# Patient Record
Sex: MALE | Race: WHITE | NOT HISPANIC OR LATINO | Employment: FULL TIME | URBAN - METROPOLITAN AREA
[De-identification: names, ages, dates, MRNs, and addresses within clinical notes are randomized per-mention and may not be internally consistent; named-entity substitution may affect disease eponyms.]

---

## 2017-10-20 ENCOUNTER — HOSPITAL ENCOUNTER (OUTPATIENT)
Dept: CARDIOLOGY | Facility: HOSPITAL | Age: 27
Discharge: HOME | End: 2017-10-20
Attending: INTERNAL MEDICINE | Admitting: INTERNAL MEDICINE

## 2018-06-21 ENCOUNTER — HOSPITAL ENCOUNTER (OUTPATIENT)
Dept: OTHER | Facility: HOSPITAL | Age: 28
Setting detail: SPECIMEN
Discharge: HOME | End: 2018-06-21
Attending: SURGERY | Admitting: SURGERY

## 2020-04-23 ENCOUNTER — TELEMEDICINE (OUTPATIENT)
Dept: FAMILY MEDICINE CLINIC | Facility: CLINIC | Age: 30
End: 2020-04-23

## 2020-04-23 DIAGNOSIS — R19.7 DIARRHEA, UNSPECIFIED TYPE: ICD-10-CM

## 2020-04-23 DIAGNOSIS — K21.9 GASTROESOPHAGEAL REFLUX DISEASE WITHOUT ESOPHAGITIS: ICD-10-CM

## 2020-04-23 DIAGNOSIS — G44.219 EPISODIC TENSION-TYPE HEADACHE, NOT INTRACTABLE: Primary | ICD-10-CM

## 2020-04-23 PROBLEM — R07.9 CHEST PAIN: Status: RESOLVED | Noted: 2017-10-04 | Resolved: 2020-04-23

## 2020-04-23 PROBLEM — R07.9 CHEST PAIN: Status: ACTIVE | Noted: 2017-10-04

## 2020-04-23 PROCEDURE — 99203 OFFICE O/P NEW LOW 30 MIN: CPT | Performed by: FAMILY MEDICINE

## 2020-04-23 RX ORDER — FAMOTIDINE 40 MG/1
40 TABLET, FILM COATED ORAL DAILY PRN
Qty: 90 TABLET | Refills: 0 | Status: SHIPPED | OUTPATIENT
Start: 2020-04-23 | End: 2020-09-08

## 2020-04-23 RX ORDER — DIPHENOXYLATE HYDROCHLORIDE AND ATROPINE SULFATE 2.5; .025 MG/1; MG/1
1 TABLET ORAL
COMMUNITY
End: 2020-09-14

## 2020-04-23 NOTE — PROGRESS NOTES
Subjective   John Andrews is a 29 y.o. male.     Chief Complaint   Patient presents with   • Hypertension     BP has been getting high and he has been getting headaches. Patient had a test done before that showed a leaky valve.   • Heartburn     heartburn x1 year it comes and goes          Current Outpatient Medications:   •  famotidine (PEPCID) 40 MG tablet, Take 1 tablet by mouth Daily As Needed for Indigestion or Heartburn., Disp: 90 tablet, Rfl: 0  •  multivitamin (THERAGRAN) tablet tablet, Take 1 tablet by mouth Daily With Breakfast., Disp: , Rfl:     History reviewed. No pertinent past medical history.    Past Surgical History:   Procedure Laterality Date   • PILONIDAL CYSTECTOMY         Family History   Problem Relation Age of Onset   • No Known Problems Father    • Stroke Maternal Grandmother    • Hypertension Maternal Grandmother    • Hypertension Paternal Grandfather    • Heart disease Paternal Grandfather         Hypertrophic Cardiomyopathy/ Valve   • Diabetes Other    • Stroke Other    • No Known Problems Mother    • No Known Problems Sister    • Cancer Maternal Grandfather         Lung Cancer       Social History     Socioeconomic History   • Marital status:      Spouse name: Not on file   • Number of children: Not on file   • Years of education: Not on file   • Highest education level: Not on file   Tobacco Use   • Smoking status: Never Smoker   • Smokeless tobacco: Never Used   Substance and Sexual Activity   • Alcohol use: Yes     Alcohol/week: 5.0 standard drinks     Types: 5 Cans of beer per week   • Drug use: Not Currently   • Sexual activity: Defer       28 y/o C male here to [facility] care via videovisit w/ a few concerns    Pt has recently been having Post Rt sided HA's x 1mo or so and checked his BP (BP <130/100) -----Wife is Rad tech and took it manually ......the patient states HA's started about month ago and admits he went on furlough about 6 weeks ago.......denies N/V/Visual issues w/ the  HA's; Pt states he took some Asa for them and they finally went away; no family hx/o Migraines in family or personally       Pt also w/ c/o's GERD symptoms off/on w/ unknown triggers ------Pt tried taking Tums but not too effective; no black stools or N/ V/ bloating  Pt also w/ off/on non-bloody watery Diarrhea x 1 yr------Admits not tried doing a food/ incident log; denies hx/o Dairy intol.; not sure if it has any assoc w/ stress       The following portions of the patient's history were reviewed and updated as appropriate: allergies, current medications, past family history, past medical history, past social history, past surgical history and problem list.    Review of Systems   Constitutional: Positive for activity change, appetite change, fatigue and unexpected weight gain. Negative for fever and unexpected weight loss.   Eyes: Negative for blurred vision, photophobia and visual disturbance.   Gastrointestinal: Positive for diarrhea and GERD. Negative for abdominal distention, abdominal pain, constipation, nausea and vomiting.   Skin: Negative for rash.   Neurological: Positive for headache. Negative for dizziness, syncope, weakness and light-headedness.   Psychiatric/Behavioral: Negative for dysphoric mood and sleep disturbance.       There were no vitals filed for this visit.    Objective   Physical Exam   Constitutional: He is oriented to person, place, and time. He appears well-developed and well-nourished. No distress.   HENT:   Head: Normocephalic and atraumatic.   Neck: Normal range of motion.   Pulmonary/Chest: Effort normal. No respiratory distress.   Neurological: He is alert and oriented to person, place, and time. No cranial nerve deficit.   Skin: He is not diaphoretic.   Psychiatric: He has a normal mood and affect. His behavior is normal. Judgment and thought content normal.         Assessment/Plan   John was seen today for hypertension and heartburn.    Diagnoses and all orders for this  visit:    Episodic tension-type headache, not intractable    Gastroesophageal reflux disease without esophagitis    Diarrhea, unspecified type    Other orders  -     famotidine (PEPCID) 40 MG tablet; Take 1 tablet by mouth Daily As Needed for Indigestion or Heartburn.    Disc'd tx options for HA.....The patient is advised to apply ice or cold packs intermittently as needed to relieve pain/ NSAIDS  Disc'd GERD triggers/ avoidance  Disc'd poss lactose intol vs stress induced diarrhea vs other foods------pt to keep food/ incident log  Pt to cont to monitor BP when at rest and call if stays >140/90's

## 2020-08-17 ENCOUNTER — OFFICE (AMBULATORY)
Dept: URBAN - METROPOLITAN AREA CLINIC 64 | Facility: CLINIC | Age: 30
End: 2020-08-17

## 2020-08-17 VITALS
HEIGHT: 75 IN | HEART RATE: 75 BPM | SYSTOLIC BLOOD PRESSURE: 144 MMHG | DIASTOLIC BLOOD PRESSURE: 80 MMHG | WEIGHT: 268 LBS

## 2020-08-17 DIAGNOSIS — R19.7 DIARRHEA, UNSPECIFIED: ICD-10-CM

## 2020-08-17 DIAGNOSIS — K62.5 HEMORRHAGE OF ANUS AND RECTUM: ICD-10-CM

## 2020-08-17 DIAGNOSIS — R19.8 OTHER SPECIFIED SYMPTOMS AND SIGNS INVOLVING THE DIGESTIVE S: ICD-10-CM

## 2020-08-17 DIAGNOSIS — R19.4 CHANGE IN BOWEL HABIT: ICD-10-CM

## 2020-08-17 PROCEDURE — 99244 OFF/OP CNSLTJ NEW/EST MOD 40: CPT | Performed by: NURSE PRACTITIONER

## 2020-08-24 ENCOUNTER — OFFICE (AMBULATORY)
Dept: URBAN - METROPOLITAN AREA PATHOLOGY 4 | Facility: PATHOLOGY | Age: 30
End: 2020-08-24
Payer: COMMERCIAL

## 2020-08-24 ENCOUNTER — ON CAMPUS - OUTPATIENT (AMBULATORY)
Dept: URBAN - METROPOLITAN AREA HOSPITAL 2 | Facility: HOSPITAL | Age: 30
End: 2020-08-24
Payer: COMMERCIAL

## 2020-08-24 VITALS
DIASTOLIC BLOOD PRESSURE: 97 MMHG | SYSTOLIC BLOOD PRESSURE: 141 MMHG | SYSTOLIC BLOOD PRESSURE: 117 MMHG | SYSTOLIC BLOOD PRESSURE: 158 MMHG | HEART RATE: 87 BPM | HEART RATE: 84 BPM | OXYGEN SATURATION: 100 % | SYSTOLIC BLOOD PRESSURE: 128 MMHG | DIASTOLIC BLOOD PRESSURE: 74 MMHG | DIASTOLIC BLOOD PRESSURE: 90 MMHG | WEIGHT: 264 LBS | OXYGEN SATURATION: 97 % | OXYGEN SATURATION: 98 % | HEART RATE: 93 BPM | HEART RATE: 66 BPM | SYSTOLIC BLOOD PRESSURE: 131 MMHG | SYSTOLIC BLOOD PRESSURE: 119 MMHG | OXYGEN SATURATION: 99 % | HEART RATE: 72 BPM | DIASTOLIC BLOOD PRESSURE: 60 MMHG | DIASTOLIC BLOOD PRESSURE: 84 MMHG | HEIGHT: 75 IN | RESPIRATION RATE: 16 BRPM | HEART RATE: 86 BPM | RESPIRATION RATE: 18 BRPM | DIASTOLIC BLOOD PRESSURE: 81 MMHG | SYSTOLIC BLOOD PRESSURE: 121 MMHG | TEMPERATURE: 98 F | DIASTOLIC BLOOD PRESSURE: 66 MMHG | SYSTOLIC BLOOD PRESSURE: 153 MMHG | DIASTOLIC BLOOD PRESSURE: 99 MMHG | HEART RATE: 96 BPM | DIASTOLIC BLOOD PRESSURE: 88 MMHG

## 2020-08-24 DIAGNOSIS — K62.5 HEMORRHAGE OF ANUS AND RECTUM: ICD-10-CM

## 2020-08-24 DIAGNOSIS — K52.9 NONINFECTIVE GASTROENTERITIS AND COLITIS, UNSPECIFIED: ICD-10-CM

## 2020-08-24 DIAGNOSIS — R19.4 CHANGE IN BOWEL HABIT: ICD-10-CM

## 2020-08-24 DIAGNOSIS — R19.7 DIARRHEA, UNSPECIFIED: ICD-10-CM

## 2020-08-24 DIAGNOSIS — K62.89 OTHER SPECIFIED DISEASES OF ANUS AND RECTUM: ICD-10-CM

## 2020-08-24 PROBLEM — K63.89 OTHER SPECIFIED DISEASES OF INTESTINE: Status: ACTIVE | Noted: 2020-08-24

## 2020-08-24 LAB
GI HISTOLOGY: A. UNSPECIFIED: (no result)
GI HISTOLOGY: B. SELECT: (no result)
GI HISTOLOGY: PDF REPORT: (no result)

## 2020-08-24 PROCEDURE — 45380 COLONOSCOPY AND BIOPSY: CPT | Performed by: INTERNAL MEDICINE

## 2020-08-24 PROCEDURE — 88305 TISSUE EXAM BY PATHOLOGIST: CPT | Performed by: INTERNAL MEDICINE

## 2020-09-08 RX ORDER — FAMOTIDINE 40 MG/1
TABLET, FILM COATED ORAL
Qty: 90 TABLET | Refills: 1 | Status: SHIPPED | OUTPATIENT
Start: 2020-09-08 | End: 2020-09-14

## 2020-09-14 ENCOUNTER — OFFICE VISIT (OUTPATIENT)
Dept: CARDIOLOGY | Facility: CLINIC | Age: 30
End: 2020-09-14

## 2020-09-14 VITALS
HEART RATE: 75 BPM | HEIGHT: 75 IN | DIASTOLIC BLOOD PRESSURE: 86 MMHG | SYSTOLIC BLOOD PRESSURE: 123 MMHG | BODY MASS INDEX: 33.07 KG/M2 | WEIGHT: 266 LBS | OXYGEN SATURATION: 97 %

## 2020-09-14 DIAGNOSIS — R01.1 HEART MURMUR: Primary | ICD-10-CM

## 2020-09-14 DIAGNOSIS — I10 ESSENTIAL HYPERTENSION: ICD-10-CM

## 2020-09-14 PROCEDURE — 99203 OFFICE O/P NEW LOW 30 MIN: CPT | Performed by: INTERNAL MEDICINE

## 2020-09-14 NOTE — PROGRESS NOTES
"    Subjective:     Encounter Date:09/14/2020      Patient ID: John Andrews is a 30 y.o. male.    Chief Complaint:  History of Present Illness 30-year-old white male with history of hypertension and valvular heart disease with heart murmur presents to office for follow-up.  Patient is currently stable without evidence of chest pain or shortness of breath at rest or exertion.  No complains of any PND orthopnea.  No palpitation dizziness syncope or swelling of the feet.  Patient has been taking all the medicines regularly.  Patient does not smoke.  Patient is also exercising regularly follows a good diet    The following portions of the patient's history were reviewed and updated as appropriate: allergies, current medications, past family history, past medical history, past social history, past surgical history and problem list.  History reviewed. No pertinent past medical history.  Past Surgical History:   Procedure Laterality Date   • COLONOSCOPY     • PILONIDAL CYSTECTOMY       /86 (BP Location: Left arm, Patient Position: Sitting)   Pulse 75   Ht 190.5 cm (75\")   Wt 121 kg (266 lb)   SpO2 97%   BMI 33.25 kg/m²   Family History   Problem Relation Age of Onset   • No Known Problems Father    • Stroke Maternal Grandmother    • Hypertension Maternal Grandmother    • Hypertension Paternal Grandfather    • Heart disease Paternal Grandfather         Hypertrophic Cardiomyopathy/ Valve   • Diabetes Other    • Stroke Other    • No Known Problems Mother    • No Known Problems Sister    • Cancer Maternal Grandfather         Lung Cancer     No current outpatient medications on file.  No Known Allergies  Social History     Socioeconomic History   • Marital status:      Spouse name: Not on file   • Number of children: Not on file   • Years of education: Not on file   • Highest education level: Not on file   Tobacco Use   • Smoking status: Never Smoker   • Smokeless tobacco: Never Used   Substance and Sexual " Activity   • Alcohol use: Yes     Alcohol/week: 5.0 standard drinks     Types: 5 Cans of beer per week   • Drug use: Not Currently   • Sexual activity: Defer     Review of Systems   Constitution: Negative for fever and malaise/fatigue.   Cardiovascular: Negative for chest pain, dyspnea on exertion and palpitations.   Respiratory: Negative for cough and shortness of breath.    Skin: Negative for rash.   Gastrointestinal: Negative for abdominal pain, nausea and vomiting.   Neurological: Negative for focal weakness and headaches.   All other systems reviewed and are negative.             Objective:     Constitutional:       Appearance: Well-developed.   Eyes:      General: No scleral icterus.     Conjunctiva/sclera: Conjunctivae normal.   HENT:      Head: Normocephalic and atraumatic.   Neck:      Musculoskeletal: Normal range of motion and neck supple.      Vascular: No carotid bruit or JVD.   Pulmonary:      Effort: Pulmonary effort is normal.      Breath sounds: Normal breath sounds. No wheezing. No rales.   Cardiovascular:      Normal rate. Regular rhythm.      Murmurs: There is a systolic murmur.   Pulses:     Intact distal pulses.   Abdominal:      General: Bowel sounds are normal.      Palpations: Abdomen is soft.   Skin:     General: Skin is warm and dry.      Findings: No rash.   Neurological:      Mental Status: Alert.       Procedures    Lab Review:       Assessment:          Diagnosis Plan   1. Heart murmur     2. Essential hypertension            Plan:       Patient has history of hypertension and is currently stable without any medications  Patient has a heart murmur I will check echocardiogram for LV function valvular abnormalities

## 2021-06-28 ENCOUNTER — OFFICE VISIT (OUTPATIENT)
Dept: FAMILY MEDICINE CLINIC | Facility: CLINIC | Age: 31
End: 2021-06-28

## 2021-06-28 VITALS
BODY MASS INDEX: 33.2 KG/M2 | HEIGHT: 75 IN | DIASTOLIC BLOOD PRESSURE: 82 MMHG | WEIGHT: 267 LBS | TEMPERATURE: 97.5 F | RESPIRATION RATE: 18 BRPM | SYSTOLIC BLOOD PRESSURE: 123 MMHG | HEART RATE: 80 BPM | OXYGEN SATURATION: 97 %

## 2021-06-28 DIAGNOSIS — M54.50 RECURRENT LOW BACK PAIN: Primary | ICD-10-CM

## 2021-06-28 DIAGNOSIS — Z87.828 HISTORY OF BACK STRAIN: ICD-10-CM

## 2021-06-28 PROCEDURE — 99213 OFFICE O/P EST LOW 20 MIN: CPT | Performed by: FAMILY MEDICINE

## 2021-06-28 RX ORDER — NAPROXEN 250 MG/1
500 TABLET ORAL 2 TIMES DAILY PRN
Start: 2021-06-28 | End: 2022-08-10

## 2021-06-28 RX ORDER — METHOCARBAMOL 500 MG/1
500 TABLET, FILM COATED ORAL NIGHTLY PRN
Qty: 10 TABLET | Refills: 0 | Status: SHIPPED | OUTPATIENT
Start: 2021-06-28 | End: 2022-08-10

## 2021-10-07 ENCOUNTER — TELEPHONE (OUTPATIENT)
Dept: FAMILY MEDICINE CLINIC | Facility: CLINIC | Age: 31
End: 2021-10-07

## 2021-10-07 RX ORDER — FAMOTIDINE 40 MG/1
40 TABLET, FILM COATED ORAL DAILY
Qty: 90 TABLET | Refills: 0 | Status: SHIPPED | OUTPATIENT
Start: 2021-10-07 | End: 2022-08-10

## 2021-10-07 RX ORDER — FAMOTIDINE 40 MG/1
40 TABLET, FILM COATED ORAL DAILY
Qty: 90 TABLET | Refills: 0 | Status: SHIPPED | OUTPATIENT
Start: 2021-10-07 | End: 2021-10-07 | Stop reason: SDUPTHER

## 2021-10-07 RX ORDER — FAMOTIDINE 40 MG/1
TABLET, FILM COATED ORAL
Qty: 90 TABLET | Refills: 1 | OUTPATIENT
Start: 2021-10-07

## 2021-10-07 NOTE — TELEPHONE ENCOUNTER
Caller: LUCAS FALLON    Relationship to patient: Emergency Contact    Best call back number: [phone] (M)    Patient is needing:[facility]INT CALLED IN FOR A REFILL ON FAMOTIDINE 40MG. MEDICATION IS NOT LISTED ON MEDICATION LIST, AND PATIENT IS OUT OF MEDICATION. PLEASE ADVISE       [id number] - [facility] [address] [address] AT [facility] [address] [address] - [phone] [facility] [phone]   [phone]

## 2021-10-07 NOTE — TELEPHONE ENCOUNTER
Rx Refill Note  Requested Prescriptions      No prescriptions requested or ordered in this encounter      Last office visit with prescribing clinician: 6/28/2021      Next office visit with prescribing clinician: Visit date not found            Belen Hdez CMA  10/07/21, 13:44 EDT

## 2022-01-18 ENCOUNTER — TELEMEDICINE (OUTPATIENT)
Dept: FAMILY MEDICINE CLINIC | Facility: TELEHEALTH | Age: 32
End: 2022-01-18

## 2022-01-18 DIAGNOSIS — B97.89 VIRAL RESPIRATORY INFECTION: Primary | ICD-10-CM

## 2022-01-18 DIAGNOSIS — J98.8 VIRAL RESPIRATORY INFECTION: Primary | ICD-10-CM

## 2022-01-18 PROCEDURE — 99213 OFFICE O/P EST LOW 20 MIN: CPT | Performed by: NURSE PRACTITIONER

## 2022-01-18 RX ORDER — BROMPHENIRAMINE MALEATE, PSEUDOEPHEDRINE HYDROCHLORIDE, AND DEXTROMETHORPHAN HYDROBROMIDE 2; 30; 10 MG/5ML; MG/5ML; MG/5ML
5 SYRUP ORAL 3 TIMES DAILY PRN
Qty: 118 ML | Refills: 0 | Status: SHIPPED | OUTPATIENT
Start: 2022-01-18 | End: 2022-08-10

## 2022-01-18 NOTE — PROGRESS NOTES
"You have chosen to receive care through a telehealth visit.  Do you consent to use a video/audio connection for your medical care today? Yes     CHIEF COMPLAINT  No chief complaint on file.        HPI  John Andrews is a 31 y.o. male  presents with complaint of having nasal congestion,  feeling neck and shoulder pain and chills. Nasal congestion on Sunday and then feeling \"sick\" yesterday.     Review of Systems   Constitutional: Positive for chills, diaphoresis, fatigue and fever (99.2).   HENT: Positive for congestion, postnasal drip, rhinorrhea, sinus pressure, sneezing and sore throat.    Respiratory: Positive for cough (mild cough due to drainage. ). Negative for chest tightness, shortness of breath and wheezing.    Cardiovascular: Negative for chest pain.   Gastrointestinal: Positive for diarrhea and nausea. Negative for vomiting.   Musculoskeletal: Positive for myalgias and neck pain.       Past Medical History:   Diagnosis Date   • Low back pain    • Pain in both feet    • Sever's disease 2003       Family History   Problem Relation Age of Onset   • No Known Problems Father    • Stroke Maternal Grandmother    • Hypertension Maternal Grandmother    • Hypertension Paternal Grandfather    • Heart disease Paternal Grandfather         Hypertrophic Cardiomyopathy/ Valve   • Diabetes Other    • Stroke Other    • No Known Problems Mother    • No Known Problems Sister    • Cancer Maternal Grandfather         Lung Cancer       Social History     Socioeconomic History   • Marital status:    Tobacco Use   • Smoking status: Never Smoker   • Smokeless tobacco: Never Used   Substance and Sexual Activity   • Alcohol use: Yes     Comment: occ   • Drug use: Not Currently   • Sexual activity: Defer         There were no vitals taken for this visit.    PHYSICAL EXAM  Physical Exam   Constitutional: He is oriented to person, place, and time. He appears well-developed and well-nourished. He does not have a sickly " appearance. He does not appear ill. No distress.   HENT:   Head: Normocephalic and atraumatic.   Nose: Mucosal edema present.   Eyes: EOM are normal.   Neck: Neck normal appearance.  Pulmonary/Chest: Effort normal.  No respiratory distress.  Neurological: He is alert and oriented to person, place, and time.   Skin: Skin is dry.   Psychiatric: He has a normal mood and affect.       No results found for this or any previous visit.    Diagnoses and all orders for this visit:    1. Viral respiratory infection (Primary)  -     COVID-19,APTIMA PANTHER(LILIAN),[facility], NP/OP SWAB IN UTM/VTM/[facility] [employer],24 HR TAT - Swab, Nasopharynx; Future  -     QUESTIONNAIRE SERIES    Other orders  -     brompheniramine-pseudoephedrine-DM 30-2-10 MG/5ML syrup; Take 5 mL by mouth 3 (Three) Times a Day As Needed for Allergies.  Dispense: 118 mL; Refill: 0      COVID-19, mRNA, LNP-S, PF, 30 mcg/0.3 mL dose (COVID-19 (PFIZER)) 5/18/2021, 4/27/2021         FOLLOW-UP  As discussed during visit with PCP/[facility] if no improvement or Urgent Care/Emergency Department if worsening of symptoms    Patient verbalizes understanding of medication dosage, comfort measures, instructions for treatment and follow-up.    ASHLEY Fitzpatrick  01/18/2022  14:22 EST    This visit was performed via Telehealth.  This patient has been instructed to follow-up with their primary care provider if their symptoms worsen or the treatment provided does not resolve their illness.

## 2022-01-18 NOTE — PATIENT INSTRUCTIONS
Symptoms may worsen over the next several days.   I have included a COVID-19 test. Go to your nearest [facility]gent Care for testing. Tell them you have already been seen virtually and only need testing   We will notify you of your COVID-19 results by phone. You will receive a call from an unknown or blocked number. You can also get your results on your [employer] jacqui.  You will need to remain quarantined for 10 days from onset of symptoms and only to discontinue if symptoms have improved and no fever for 24 hours without the use of fever reducing medications such as Tylenol (acetaminophen), Advil (ibuprfen), or Aleve (naproxen). You may discontinue after 5 days if your symptoms have resolved and you have no fever for 24 hours without the use of fever reducing medications such as Tylenol (acetaminophen), Advil (ibuprfen), or Aleve (naproxen).   Continue to wear a mask for 10 days or until symptoms resolve. If symptoms worsen, go to Urgent Care or Emergency Department for care.   Symptoms of Coronavirus are treated just as you would for any viral illness. Take Tylenol and/or Ibuprofen for pain and/or fever, you may find it better to alternate these every 4 hours, so that you are only taking each every 8 hours. Stay hydrated, rest and you may treat cough with over-the-counter cough syrup such as Robitussin.  If your symptoms do not improve, symptoms change or do not fully resolve, seek further evaluation with your primary care provider or Urgent Care. If you develop severe symptoms, such as chest pain, high fever, difficulty breathing, difficulty urinating or have any symptoms that interfere with your ability to function go to the nearest Emergency Department immediately.        Symptom Relief for Viral Illnesses       1. DIAGNOSIS  2. GENERAL INSTRUCTIONS   · Cold or cough  · Middle ear fluid ([employer] with Effusion, OME)  · Flu  · Viral sore throat  · Bronchitis    You have been diagnosed with an illness  caused by a virus.  Antibiotics do not work on viruses.  When antibiotics aren't needed, they won't help you, and the side effects could still hurt you.  The treatments prescribed below will help you feel better while your body fights off the virus.   · Drink extra water and fluids  · Use a cool mist vaporizer or saline nasal spray to relieve congestion  · For sore throats in older children and adults, use ice chips, sore throat spray, or lozenges  · Use honey to relieve cough.  Do not give honey to an infant younger than 1 year.      3. SPECIFIC MEDICINES  4. FOLLOW UP   Use over-the-counter medications specific to your symptoms. Use medicines according to the package instructions or as directed by your healthcare professional.  Stop the medication when the symptoms get better.   If not improved in 5-7 days, if new symptoms occur, or if you have other concerns, please call or return to the office for a recheck.         To learn more about antibiotic prescribing and use, visit www.cdc.gov/antibiotic-use

## 2022-07-22 ENCOUNTER — TELEPHONE (OUTPATIENT)
Dept: FAMILY MEDICINE CLINIC | Facility: CLINIC | Age: 32
End: 2022-07-22

## 2022-07-22 NOTE — TELEPHONE ENCOUNTER
Caller: Tangela Andrews    Relationship: SPOUSE    Best call back number: [phone] (H)    What is the medical concern/diagnosis: LOWER BACK PAIN    What specialty or service is being requested: PHYSICAL THERAPY    What is the provider, practice or medical service name: [facility]    What is the office location: [address] [place] [address]    What is the office phone number: [phone]    Any additional details: PATIENTS WIFE CALLED REQUESTING A REFERRAL FOR PHYSICAL THERAPY FOR HER HUSBANDS LOWER BACK PAIN.    PLEASE CALL AND ADVISE.

## 2022-08-10 ENCOUNTER — OFFICE VISIT (OUTPATIENT)
Dept: CARDIOLOGY | Facility: CLINIC | Age: 32
End: 2022-08-10

## 2022-08-10 VITALS
HEART RATE: 77 BPM | DIASTOLIC BLOOD PRESSURE: 82 MMHG | OXYGEN SATURATION: 97 % | HEIGHT: 75 IN | SYSTOLIC BLOOD PRESSURE: 123 MMHG | BODY MASS INDEX: 35.31 KG/M2 | WEIGHT: 284 LBS

## 2022-08-10 DIAGNOSIS — I10 ESSENTIAL HYPERTENSION: ICD-10-CM

## 2022-08-10 DIAGNOSIS — R01.1 HEART MURMUR: Primary | ICD-10-CM

## 2022-08-10 PROCEDURE — 93000 ELECTROCARDIOGRAM COMPLETE: CPT | Performed by: INTERNAL MEDICINE

## 2022-08-10 PROCEDURE — 99213 OFFICE O/P EST LOW 20 MIN: CPT | Performed by: INTERNAL MEDICINE

## 2022-08-10 NOTE — PROGRESS NOTES
"    Subjective:     Encounter Date:08/10/2022      Patient ID: John Andrews is a 31 y.o. male.    Chief Complaint:  History of Present Illness 31-year-old white male with history of heart murmur hypertension the past presents to my office for follow-up.  Patient was supposed to have an echocardiogram but he never scheduled it.  No complains any chest pain or shortness of breath.  No PND orthopnea.  No palpitation dizziness syncope or swelling of the feet.  He is not taking any medicines regularly.  He says his blood pressure is currently stable     The following portions of the patient's history were reviewed and updated as appropriate: allergies, current medications, past family history, past medical history, past social history, past surgical history and problem list.  Past Medical History:   Diagnosis Date   • Low back pain    • Pain in both feet    • Sever's disease 2003     Past Surgical History:   Procedure Laterality Date   • COLONOSCOPY      NEG = 2020   • PILONIDAL CYSTECTOMY  2019     /82 (BP Location: Left arm, Patient Position: Sitting)   Pulse 77   Ht 190.5 cm (75\")   Wt 129 kg (284 lb)   SpO2 97%   BMI 35.50 kg/m²   Family History   Problem Relation Age of Onset   • No Known Problems Father    • Stroke Maternal Grandmother    • Hypertension Maternal Grandmother    • Hypertension Paternal Grandfather    • Heart disease Paternal Grandfather         Hypertrophic Cardiomyopathy/ Valve   • Diabetes Other    • Stroke Other    • No Known Problems Mother    • No Known Problems Sister    • Cancer Maternal Grandfather         Lung Cancer     No current outpatient medications on file.  No Known Allergies  Social History     Socioeconomic History   • Marital status:    Tobacco Use   • Smoking status: Never Smoker   • Smokeless tobacco: Never Used   Substance and Sexual Activity   • Alcohol use: Yes     Comment: occ   • Drug use: Not Currently   • Sexual activity: Defer     Review of Systems "   Constitutional: Negative for fever and malaise/fatigue.   Cardiovascular: Negative for chest pain, dyspnea on exertion and palpitations.   Respiratory: Negative for cough and shortness of breath.    Skin: Negative for rash.   Gastrointestinal: Negative for abdominal pain, nausea and vomiting.   Neurological: Positive for headaches. Negative for focal weakness.   All other systems reviewed and are negative.             Objective:     Constitutional:       Appearance: Well-developed.   Eyes:      General: No scleral icterus.     Conjunctiva/sclera: Conjunctivae normal.   HENT:      Head: Normocephalic and atraumatic.   Neck:      Vascular: No carotid bruit or JVD.   Pulmonary:      Effort: Pulmonary effort is normal.      Breath sounds: Normal breath sounds. No wheezing. No rales.   Cardiovascular:      Normal rate. Regular rhythm.      Murmurs: There is a systolic murmur.   Pulses:     Intact distal pulses.   Abdominal:      General: Bowel sounds are normal.      Palpations: Abdomen is soft.   Musculoskeletal:      Cervical back: Normal range of motion and neck supple. Skin:     General: Skin is warm and dry.      Findings: No rash.   Neurological:      Mental Status: Alert.         ECG 12 Lead    Date/Time: 8/10/2022 3:04 PM  Performed by: Agus Luque MD  Authorized by: Agus Luque MD   Comments: Normal sinus rhythm            Lab Review:         MDM #1 heart murmur  Patient has a heart murmur and he was scheduled for an echocardiogram but he never did it and hence I will perform again  2.  Hypertension  Patient has history of hypertension but his blood pressure much stable on his not on any medicines.    Patient's previous medical records, labs, and EKG were reviewed and discussed with the patient at today's visit.

## 2022-08-24 ENCOUNTER — HOSPITAL ENCOUNTER (OUTPATIENT)
Dept: CARDIOLOGY | Facility: HOSPITAL | Age: 32
Discharge: HOME | End: 2022-08-24
Admitting: INTERNAL MEDICINE

## 2022-08-24 VITALS
DIASTOLIC BLOOD PRESSURE: 88 MMHG | WEIGHT: 284 LBS | SYSTOLIC BLOOD PRESSURE: 122 MMHG | HEIGHT: 75 IN | BODY MASS INDEX: 35.31 KG/M2

## 2022-08-24 DIAGNOSIS — R01.1 HEART MURMUR: ICD-10-CM

## 2022-08-24 DIAGNOSIS — I10 ESSENTIAL HYPERTENSION: ICD-10-CM

## 2022-08-24 LAB
BH CV ECHO MEAS - ACS: 2.7 CM
BH CV ECHO MEAS - AO MAX PG: 2.9 MMHG
BH CV ECHO MEAS - AO MEAN PG: 1.72 MMHG
BH CV ECHO MEAS - AO ROOT DIAM: 3.9 CM
BH CV ECHO MEAS - AO V2 MAX: 84.6 CM/SEC
BH CV ECHO MEAS - AO V2 VTI: 13.9 CM
BH CV ECHO MEAS - AVA(I,D): 3.5 CM2
BH CV ECHO MEAS - EDV(CUBED): 106.1 ML
BH CV ECHO MEAS - EDV(MOD-SP4): 72.4 ML
BH CV ECHO MEAS - EF(MOD-BP): 57 %
BH CV ECHO MEAS - EF(MOD-SP4): 56.8 %
BH CV ECHO MEAS - ESV(CUBED): 48.1 ML
BH CV ECHO MEAS - ESV(MOD-SP4): 31.3 ML
BH CV ECHO MEAS - FS: 23.2 %
BH CV ECHO MEAS - IVS/LVPW: 1.28 CM
BH CV ECHO MEAS - IVSD: 1.38 CM
BH CV ECHO MEAS - LA A2CS (ATRIAL LENGTH): 3.6 CM
BH CV ECHO MEAS - LV DIASTOLIC VOL/BSA (35-75): 28.4 CM2
BH CV ECHO MEAS - LV MASS(C)D: 221.7 GRAMS
BH CV ECHO MEAS - LV MAX PG: 1.92 MMHG
BH CV ECHO MEAS - LV MEAN PG: 1.2 MMHG
BH CV ECHO MEAS - LV SYSTOLIC VOL/BSA (12-30): 12.3 CM2
BH CV ECHO MEAS - LV V1 MAX: 69.2 CM/SEC
BH CV ECHO MEAS - LV V1 VTI: 12.8 CM
BH CV ECHO MEAS - LVIDD: 4.7 CM
BH CV ECHO MEAS - LVIDS: 3.6 CM
BH CV ECHO MEAS - LVOT AREA: 3.8 CM2
BH CV ECHO MEAS - LVOT DIAM: 2.2 CM
BH CV ECHO MEAS - LVPWD: 1.08 CM
BH CV ECHO MEAS - MV A MAX VEL: 55 CM/SEC
BH CV ECHO MEAS - MV DEC SLOPE: 201.9 CM/SEC2
BH CV ECHO MEAS - MV DEC TIME: 0.24 MSEC
BH CV ECHO MEAS - MV E MAX VEL: 48.7 CM/SEC
BH CV ECHO MEAS - MV E/A: 0.89
BH CV ECHO MEAS - MV MAX PG: 1.47 MMHG
BH CV ECHO MEAS - MV MEAN PG: 0.87 MMHG
BH CV ECHO MEAS - MV V2 VTI: 13.7 CM
BH CV ECHO MEAS - MVA(VTI): 3.5 CM2
BH CV ECHO MEAS - PA ACC TIME: 0.06 SEC
BH CV ECHO MEAS - PA PR(ACCEL): 54.1 MMHG
BH CV ECHO MEAS - PA V2 MAX: 91.3 CM/SEC
BH CV ECHO MEAS - RAP SYSTOLE: 3 MMHG
BH CV ECHO MEAS - RV MAX PG: 1.64 MMHG
BH CV ECHO MEAS - RV V1 MAX: 64.1 CM/SEC
BH CV ECHO MEAS - RV V1 VTI: 13.3 CM
BH CV ECHO MEAS - RVDD: 3.5 CM
BH CV ECHO MEAS - SI(MOD-SP4): 16.1 ML/M2
BH CV ECHO MEAS - SV(LVOT): 48.4 ML
BH CV ECHO MEAS - SV(MOD-SP4): 41.1 ML
MAXIMAL PREDICTED HEART RATE: 190 BPM
STRESS TARGET HR: 162 BPM

## 2022-08-24 PROCEDURE — 93306 TTE W/DOPPLER COMPLETE: CPT

## 2022-08-24 PROCEDURE — 93306 TTE W/DOPPLER COMPLETE: CPT | Performed by: INTERNAL MEDICINE

## 2022-08-30 ENCOUNTER — OFFICE VISIT (OUTPATIENT)
Dept: FAMILY MEDICINE CLINIC | Facility: CLINIC | Age: 32
End: 2022-08-30

## 2022-08-30 VITALS
SYSTOLIC BLOOD PRESSURE: 124 MMHG | RESPIRATION RATE: 16 BRPM | HEART RATE: 88 BPM | TEMPERATURE: 98 F | BODY MASS INDEX: 34.82 KG/M2 | WEIGHT: 280 LBS | OXYGEN SATURATION: 96 % | DIASTOLIC BLOOD PRESSURE: 80 MMHG | HEIGHT: 75 IN

## 2022-08-30 DIAGNOSIS — M54.50 ACUTE MIDLINE LOW BACK PAIN WITHOUT SCIATICA: Primary | ICD-10-CM

## 2022-08-30 PROCEDURE — 99213 OFFICE O/P EST LOW 20 MIN: CPT | Performed by: FAMILY MEDICINE

## 2022-08-30 RX ORDER — METHOCARBAMOL 500 MG/1
TABLET, FILM COATED ORAL
Qty: 20 TABLET | Refills: 0 | Status: SHIPPED | OUTPATIENT
Start: 2022-08-30 | End: 2022-10-10

## 2022-08-30 RX ORDER — METHYLPREDNISOLONE 4 MG/1
TABLET ORAL
Qty: 21 TABLET | Refills: 0 | Status: SHIPPED | OUTPATIENT
Start: 2022-08-30 | End: 2022-09-07

## 2022-08-30 RX ORDER — NAPROXEN 500 MG/1
500 TABLET ORAL 2 TIMES DAILY PRN
Qty: 60 TABLET | Refills: 0 | Status: SHIPPED | OUTPATIENT
Start: 2022-08-30 | End: 2022-10-10

## 2022-09-07 ENCOUNTER — OFFICE VISIT (OUTPATIENT)
Dept: FAMILY MEDICINE CLINIC | Facility: CLINIC | Age: 32
End: 2022-09-07

## 2022-09-07 ENCOUNTER — TELEPHONE (OUTPATIENT)
Dept: FAMILY MEDICINE CLINIC | Facility: CLINIC | Age: 32
End: 2022-09-07

## 2022-09-07 VITALS
TEMPERATURE: 98.6 F | OXYGEN SATURATION: 98 % | DIASTOLIC BLOOD PRESSURE: 78 MMHG | HEIGHT: 75 IN | SYSTOLIC BLOOD PRESSURE: 130 MMHG | BODY MASS INDEX: 35.31 KG/M2 | WEIGHT: 284 LBS | RESPIRATION RATE: 16 BRPM | HEART RATE: 68 BPM

## 2022-09-07 DIAGNOSIS — M54.50 RECURRENT LOW BACK PAIN: Primary | ICD-10-CM

## 2022-09-07 DIAGNOSIS — R26.89 ANTALGIC GAIT: ICD-10-CM

## 2022-09-07 PROCEDURE — 99213 OFFICE O/P EST LOW 20 MIN: CPT | Performed by: FAMILY MEDICINE

## 2022-09-07 NOTE — TELEPHONE ENCOUNTER
Caller: John Andrews    Relationship: Self    Best call back number: [phone]*    What is the best time to reach you: ANYTIME    Who are you requesting to speak with (clinical staff, provider,  specific staff member): CLINICAL    What was the call regarding: PATIENT CALLING STATING HE RECEIVED THE RESULTS OF SP[facility] [employer], BUT HAS QUESTIONS REGARDING THE RESULTS.    Do you require a callback: YES TO ANSWER QUESTIONS.

## 2022-09-07 NOTE — PROGRESS NOTES
Subjective   John Andrews is a 32 y.o. male.     Chief Complaint   Patient presents with   • Back Pain     Patient states that he would like to go ahead and have the MRI done. Patient states that it does feel a little better but it still hurts and he keeps reinjuring it.   • Hypertension         Current Outpatient Medications:   •  methocarbamol (Robaxin) 500 MG tablet, 1-2 po BID prn, Disp: 20 tablet, Rfl: 0  •  naproxen (Naprosyn) 500 MG tablet, Take 1 tablet by mouth 2 (Two) Times a Day As Needed for Moderate Pain., Disp: 60 tablet, Rfl: 0    Past Medical History:   Diagnosis Date   • Heart murmur    • Low back pain    • Pain in both feet    • Sever's disease 2003       Past Surgical History:   Procedure Laterality Date   • COLONOSCOPY      NEG = 2020   • PILONIDAL CYSTECTOMY  2019       Family History   Problem Relation Age of Onset   • No Known Problems Father    • Stroke Maternal Grandmother    • Hypertension Maternal Grandmother    • Hypertension Paternal Grandfather    • Heart disease Paternal Grandfather         Hypertrophic Cardiomyopathy/ Valve   • Diabetes Other    • Stroke Other    • No Known Problems Mother    • No Known Problems Sister    • Cancer Maternal Grandfather         Lung Cancer       Social History     Socioeconomic History   • Marital status:    Tobacco Use   • Smoking status: Light Tobacco Smoker     Packs/day: 0.00     Years: 2.00     Pack years: 0.00     Types: Cigars   • Smokeless tobacco: Never Used   • Tobacco comment: social   Vaping Use   • Vaping Use: Never used   Substance and Sexual Activity   • Alcohol use: Yes     Alcohol/week: 4.0 - 5.0 standard drinks     Types: 4 - 5 Cans of beer per week     Comment: occ   • Drug use: Never   • Sexual activity: Yes     Partners: Female     Birth control/protection: I.U.D.       33 y/o C male here for f/u on recent back injury    Pt states he took the steroid/ ms relaxer and NSAID from last week and it seemed to get better but then  he strained it again and wanting to get MRI if poss to make sure nothing worse is going on; work did put him on desk duty and he thinks he needs another week of light duty; pt is doing some stretching exercises he got from [employer] in the past and they do help----pt thinks his current job position is contributing to his back issues and will try to adjust his stance at work to lessen his back extension     Pt feels like he may limp some off/on too       The following portions of the patient's history were reviewed and updated as appropriate: allergies, current medications, past family history, past medical history, past social history, past surgical history and problem list.    Review of Systems   Constitutional: Positive for activity change. Negative for appetite change, unexpected weight gain and unexpected weight loss.   Musculoskeletal: Positive for back pain, gait problem and myalgias.   Neurological: Negative for weakness and numbness.       Vitals:    09/07/22 0825   BP: 130/78   Pulse: 68   Resp: 16   Temp: 98.6 °F (37 °C)   SpO2: 98%       Objective   Physical Exam  Vitals and nursing note reviewed.   Constitutional:       General: He is not in acute distress.     Appearance: He is not ill-appearing or toxic-appearing.   HENT:      Head: Normocephalic and atraumatic.   Pulmonary:      Effort: Pulmonary effort is normal.   Musculoskeletal:      Lumbar back: Swelling, tenderness and bony tenderness present. Negative right straight leg raise test and negative left straight leg raise test.        Back:       Comments: Ms Str = 5/5 b/l LE  +Rt back pain w/ resisted Rt hip flexion/ extension  +mid lumbar pain w/ Resisted Lt Hip flexion   Skin:     General: Skin is dry.   Neurological:      Mental Status: He is alert and oriented to person, place, and time.      Cranial Nerves: No cranial nerve deficit.   Psychiatric:         Mood and Affect: Mood normal.         Behavior: Behavior normal.         Thought Content:  Thought content normal.         Judgment: Judgment normal.           Assessment & Plan   Diagnoses and all orders for this visit:    1. Recurrent low back pain (Primary)  -     Cancel: XR Spine Lumbar Complete With Flex & Ext; Future  -     XR spine lumbar 4+ vw; Future    2. Antalgic gait  -     Cancel: XR Spine Lumbar Complete With Flex & Ext; Future  -     XR spine lumbar 4+ vw; Future      Cont light duty at work x 1 more week  XR L Spine today and poss MRI in future  Stretches demonstrated today

## 2022-09-07 NOTE — TELEPHONE ENCOUNTER
Mild arthritic changes at lowest level of back w/ mild diminished disc height at same level----still want to try for MRI at [facility] or [facility]?       Response from the patient:  Yes, [facility]ri at [facility]

## 2022-09-16 ENCOUNTER — TELEPHONE (OUTPATIENT)
Dept: FAMILY MEDICINE CLINIC | Facility: CLINIC | Age: 32
End: 2022-09-16

## 2022-09-16 NOTE — TELEPHONE ENCOUNTER
Caller: John Andrews    Relationship: Self    Best call back number: [phone]     Caller requesting test results:     What test was performed: MRI     When was the test performed: 09/16/22    Where was the test performed: [facility]    Additional notes: PATIENT WANTING TO KNOW WHAT THE RESULTS ARE AND WHAT THE NEXT STEPS WOULD BE

## 2022-09-19 DIAGNOSIS — R93.7 ABNORMAL MRI, LUMBAR SPINE: ICD-10-CM

## 2022-09-19 DIAGNOSIS — M54.50 RECURRENT LOW BACK PAIN: Primary | ICD-10-CM

## 2022-09-19 NOTE — TELEPHONE ENCOUNTER
HUB to share    Mable Key DO   9/16/2022  6:25 PM EDT         Low back at L5/S1 shows a bulging disc pushing off to the Rt------may want to see pain management to see if an epidural would work-----any preference?     Mychart msg sent

## 2022-10-10 ENCOUNTER — OFFICE VISIT (OUTPATIENT)
Dept: PAIN MEDICINE | Facility: CLINIC | Age: 32
End: 2022-10-10

## 2022-10-10 VITALS
SYSTOLIC BLOOD PRESSURE: 130 MMHG | OXYGEN SATURATION: 97 % | DIASTOLIC BLOOD PRESSURE: 79 MMHG | RESPIRATION RATE: 16 BRPM | HEART RATE: 80 BPM

## 2022-10-10 DIAGNOSIS — M54.16 LUMBAR RADICULOPATHY: ICD-10-CM

## 2022-10-10 DIAGNOSIS — M43.06 PARS DEFECT OF LUMBAR SPINE: Primary | ICD-10-CM

## 2022-10-10 PROCEDURE — 99203 OFFICE O/P NEW LOW 30 MIN: CPT | Performed by: STUDENT IN AN ORGANIZED HEALTH CARE EDUCATION/TRAINING PROGRAM

## 2022-10-10 NOTE — PROGRESS NOTES
CHIEF COMPLAINT  Chief Complaint   Patient presents with   • Back Pain     New patient referred for  Recurrent low back pain, abnormal MRI No Narcotics        Primary Care  Mable Key, DO    Subjective   John Andrews is a 32 y.o. male  who presents for chronic low back pain with intermittent lumbar radiculopathy.  He states the pain initially began several years ago however improved over time.  He states that recently, he reaggravated his back and his looking for more definitive treatment.  He states that his pain initially began when he was doing weight lifting as a sharp, shooting sensation in the back down the lower extremities bilaterally.  He states that over time, the pain resolved however has not returned.  He states the pain is worse with movement and physical activity and weightlifting and slightly improved with rest.  He describes pain primarily in the mid back with intermittent radiation down the left greater than right lower extremity with associated numbness and tingling.  He denies any red flag symptoms such as loss of bowel or bladder.  He did undergo MRI which shows L5 pars defect as well as L4-5 disc displacement with foraminal and mild central canal stenosis.    History of Present Illness     Location: Low back with intermittent radiation  Onset: Years ago  Duration: Initially improved, now returned  Timing: Intermittent throughout the day  Quality: Aching, sharp, numb  Severity: Today: 2       Last Week: 2       Worst: 7  Modifying Factors: The pain is worse with movement and physical activity and slightly improved with rest  Functional Deficit: The pain makes it difficult for him to perform his activities of daily living    Physical Therapy: no    Interval Update 10/10/2022:     The following portions of the patient's history were reviewed and updated as appropriate: allergies, current medications, past family history, past medical history, past social history, past surgical history and  problem list.    No current outpatient medications on file.    Review of Systems   Musculoskeletal: Positive for back pain and gait problem. Negative for arthralgias, joint swelling and myalgias.   Neurological: Positive for numbness. Negative for weakness.       Vitals:    10/10/22 0821   BP: 130/79   Pulse: 80   Resp: 16   SpO2: 97%   PainSc:   2       Objective   Physical Exam  Vitals and nursing note reviewed.   Constitutional:       General: He is not in acute distress.     Appearance: Normal appearance. He is well-developed. He is obese.   Neck:      Trachea: No tracheal deviation.   Musculoskeletal:         General: Normal range of motion.      Comments: Lumbar Spine Exam:  Tender to palpation over the lumbar paraspinal musculature No  Limited range of motion secondary to pain Yes  Facet loading positive: Negative  Facets tender to palpation: Negative  Straight leg raise test positive: Equivocal bilaterally       Neurological:      General: No focal deficit present.      Mental Status: He is alert.      Sensory: No sensory deficit.      Motor: No weakness.           Assessment & Plan   Problems Addressed this Visit    None  Visit Diagnoses     Pars defect of lumbar spine    -  Primary    Relevant Orders    Ambulatory Referral to Physical Therapy Evaluate and treat    XR Spine Lumbar Flex & Ext    Lumbar radiculopathy          Diagnoses       Codes Comments    Pars defect of lumbar spine    -  Primary ICD-10-CM: M43.06  ICD-9-CM: 738.4     Lumbar radiculopathy     ICD-10-CM: M54.16  ICD-9-CM: 724.4           Plan:  1. Reviewed his MRI and discussed the findings regarding the pars defect as well as the disc displacement.  He wishes to hold off on any form of interventional therapy at this time.  He states in the past, he had gotten good benefit with physical therapy  2. Can refer to physical therapy and evaluate back in approximately 1 month.  If he continues with discomfort, can consider lumbar epidural  steroid injection  3. Given his pars defect, will obtain flexion-extension x-rays lumbar spine.  If it is found that he has dynamic instability, may consider referral to neurosurgery for further intervention  --- Follow-up 1 month           INSPECT REPORT    As part of the patient's treatment plan, I may be prescribing controlled substances. The patient has been made aware of appropriate use of such medications, including potential risk of somnolence, limited ability to drive and/or work safely, and the potential for dependence or overdose. It has also bee made clear that these medications are for use by this patient only, without concomitant use of alcohol or other substances unless prescribed.     Patient has completed prescribing agreement detailing terms of continued prescribing of controlled substances, including monitoring JOHNIE reports, urine drug screening, and pill counts if necessary. The patient is aware that inappropriate use will results in cessation of prescribing such medications.    INSPECT report has been reviewed and scanned into the patient's chart.    As the clinician, I personally reviewed the INSPECT from 10/7/2022.    History and physical exam exhibit continued safe and appropriate use of controlled substances.      EMR Dragon/Transcription disclaimer:   Much of this encounter note is an electronic transcription/translation of spoken language to printed text. The electronic translation of spoken language may permit erroneous, or at times, nonsensical words or phrases to be inadvertently transcribed; Although I have reviewed the note for such errors, some may still exist.

## 2022-10-11 ENCOUNTER — PATIENT ROUNDING (BHMG ONLY) (OUTPATIENT)
Dept: PAIN MEDICINE | Facility: CLINIC | Age: 32
End: 2022-10-11

## 2022-10-11 NOTE — PROGRESS NOTES
October 11, 2022    Hello, may I speak with John Andrews?    My name is Cindy      I am  with [facility]  [address]  [place] [address].    Before we get started may I verify your date of birth? 1990    I am calling to officially welcome you to our practice and ask about your recent visit. Is this a good time to talk? yes    Tell me about your visit with us. What things went well?  Everything went well was completely satisfied.        We're always looking for ways to make our patients' experiences even better. Do you have recommendations on ways we may improve?  no    Overall were you satisfied with your first visit to our practice? yes       I appreciate you taking the time to speak with me today. Is there anything else I can do for you? no      Thank you, and have a great day.

## 2022-11-09 ENCOUNTER — OFFICE VISIT (OUTPATIENT)
Dept: PAIN MEDICINE | Facility: CLINIC | Age: 32
End: 2022-11-09

## 2022-11-09 VITALS
HEART RATE: 96 BPM | DIASTOLIC BLOOD PRESSURE: 79 MMHG | OXYGEN SATURATION: 98 % | SYSTOLIC BLOOD PRESSURE: 153 MMHG | RESPIRATION RATE: 16 BRPM

## 2022-11-09 DIAGNOSIS — M43.06 PARS DEFECT OF LUMBAR SPINE: Primary | ICD-10-CM

## 2022-11-09 DIAGNOSIS — M54.16 LUMBAR RADICULOPATHY: ICD-10-CM

## 2022-11-09 PROCEDURE — 99213 OFFICE O/P EST LOW 20 MIN: CPT | Performed by: STUDENT IN AN ORGANIZED HEALTH CARE EDUCATION/TRAINING PROGRAM

## 2022-11-09 RX ORDER — GABAPENTIN 300 MG/1
300 CAPSULE ORAL 3 TIMES DAILY PRN
Qty: 30 CAPSULE | Refills: 0 | Status: SHIPPED | OUTPATIENT
Start: 2022-11-09

## 2022-11-09 NOTE — PROGRESS NOTES
CHIEF COMPLAINT  Chief Complaint   Patient presents with   • Back Pain     Has  not been to PT yet----  Pain 3-- no meds       Primary Care  Shahid, Mable, DO    Subjective   John Andrews is a 32 y.o. male  who presents for chronic low back pain with intermittent lumbar radiculopathy.  He states the pain initially began several years ago however improved over time.  He states that recently, he reaggravated his back and his looking for more definitive treatment.  He states that his pain initially began when he was doing weight lifting as a sharp, shooting sensation in the back down the lower extremities bilaterally.  He states that over time, the pain resolved however has not returned.  He states the pain is worse with movement and physical activity and weightlifting and slightly improved with rest.  He describes pain primarily in the mid back with intermittent radiation down the left greater than right lower extremity with associated numbness and tingling.  He denies any red flag symptoms such as loss of bowel or bladder.  He did undergo MRI which shows L5 pars defect as well as L4-5 disc displacement with foraminal and mild central canal stenosis.    Back Pain  Associated symptoms include numbness. Pertinent negatives include no weakness.        Location: Low back with intermittent radiation  Onset: Years ago  Duration: Initially improved, now returned  Timing: Intermittent throughout the day  Quality: Aching, sharp, numb  Severity: Today: 2       Last Week: 2       Worst: 7  Modifying Factors: The pain is worse with movement and physical activity and slightly improved with rest  Functional Deficit: The pain makes it difficult for him to perform his activities of daily living    Physical Therapy: no    Interval Update 11/09/2022: No significant changes.  Has been busy at work and has not had the opportunity to attend physical therapy.    The following portions of the patient's history were reviewed and updated  as appropriate: allergies, current medications, past family history, past medical history, past social history, past surgical history and problem list.      Current Outpatient Medications:   •  gabapentin (NEURONTIN) 300 MG capsule, Take 1 capsule by mouth 3 (Three) Times a Day As Needed (Radicular pain)., Disp: 30 capsule, Rfl: 0    Review of Systems   Musculoskeletal: Positive for back pain and gait problem. Negative for arthralgias, joint swelling and myalgias.   Neurological: Positive for numbness. Negative for weakness.       Vitals:    11/09/22 1358   BP: 153/79   Pulse: 96   Resp: 16   SpO2: 98%   PainSc:   3       Objective   Physical Exam  Vitals and nursing note reviewed.   Constitutional:       General: He is not in acute distress.     Appearance: Normal appearance. He is well-developed. He is obese.   Neck:      Trachea: No tracheal deviation.   Musculoskeletal:         General: Normal range of motion.      Comments: Lumbar Spine Exam:  Tender to palpation over the lumbar paraspinal musculature No  Limited range of motion secondary to pain Yes  Facet loading positive: Negative  Facets tender to palpation: Negative  Straight leg raise test positive: Equivocal bilaterally       Neurological:      General: No focal deficit present.      Mental Status: He is alert.      Sensory: No sensory deficit.      Motor: No weakness.           Assessment & Plan   Problems Addressed this Visit    None  Visit Diagnoses     Pars defect of lumbar spine    -  Primary    Lumbar radiculopathy          Diagnoses       Codes Comments    Pars defect of lumbar spine    -  Primary ICD-10-CM: M43.06  ICD-9-CM: 738.4     Lumbar radiculopathy     ICD-10-CM: M54.16  ICD-9-CM: 724.4           Plan:  1. Will try gabapentin 300 mg 3 times daily as needed  2. He can do physical therapy when he is able  3. He can schedule LESI as needed for his pars defect and radiculopathy  --- Follow-up lumbar epidural as needed         INSPECT  REPORT    As part of the patient's treatment plan, I may be prescribing controlled substances. The patient has been made aware of appropriate use of such medications, including potential risk of somnolence, limited ability to drive and/or work safely, and the potential for dependence or overdose. It has also bee made clear that these medications are for use by this patient only, without concomitant use of alcohol or other substances unless prescribed.     Patient has completed prescribing agreement detailing terms of continued prescribing of controlled substances, including monitoring JOHNIE reports, urine drug screening, and pill counts if necessary. The patient is aware that inappropriate use will results in cessation of prescribing such medications.    INSPECT report has been reviewed and scanned into the patient's chart.    As the clinician, I personally reviewed the INSPECT from 11/8/2022.    History and physical exam exhibit continued safe and appropriate use of controlled substances.      EMR Dragon/Transcription disclaimer:   Much of this encounter note is an electronic transcription/translation of spoken language to printed text. The electronic translation of spoken language may permit erroneous, or at times, nonsensical words or phrases to be inadvertently transcribed; Although I have reviewed the note for such errors, some may still exist.

## 2023-01-24 ENCOUNTER — APPOINTMENT (OUTPATIENT)
Dept: CT IMAGING | Facility: HOSPITAL | Age: 33
End: 2023-01-24
Payer: COMMERCIAL

## 2023-01-24 ENCOUNTER — HOSPITAL ENCOUNTER (EMERGENCY)
Facility: HOSPITAL | Age: 33
Discharge: HOME | End: 2023-01-24
Attending: EMERGENCY MEDICINE | Admitting: EMERGENCY MEDICINE
Payer: COMMERCIAL

## 2023-01-24 VITALS
HEIGHT: 75 IN | TEMPERATURE: 98.3 F | WEIGHT: 283.73 LBS | RESPIRATION RATE: 17 BRPM | BODY MASS INDEX: 35.28 KG/M2 | SYSTOLIC BLOOD PRESSURE: 131 MMHG | OXYGEN SATURATION: 100 % | DIASTOLIC BLOOD PRESSURE: 76 MMHG | HEART RATE: 76 BPM

## 2023-01-24 DIAGNOSIS — N20.1 URETEROLITHIASIS: Primary | ICD-10-CM

## 2023-01-24 LAB
ALBUMIN SERPL-MCNC: 4.6 G/DL (ref 3.5–5.2)
ALBUMIN/GLOB SERPL: 1.6 G/DL
ALP SERPL-CCNC: 121 U/L (ref 39–117)
ALT SERPL W P-5'-P-CCNC: 44 U/L (ref 1–41)
ANION GAP SERPL CALCULATED.3IONS-SCNC: 10 MMOL/L (ref 5–15)
AST SERPL-CCNC: 31 U/L (ref 1–40)
BACTERIA UR QL AUTO: ABNORMAL /HPF
BASOPHILS # BLD AUTO: 0 10*3/MM3 (ref 0–0.2)
BASOPHILS NFR BLD AUTO: 0.4 % (ref 0–1.5)
BILIRUB SERPL-MCNC: 0.6 MG/DL (ref 0–1.2)
BILIRUB UR QL STRIP: NEGATIVE
BUN SERPL-MCNC: 13 MG/DL (ref 6–20)
BUN/CREAT SERPL: 11.6 (ref 7–25)
CALCIUM SPEC-SCNC: 9.6 MG/DL (ref 8.6–10.5)
CHLORIDE SERPL-SCNC: 104 MMOL/L (ref 98–107)
CLARITY UR: CLEAR
CO2 SERPL-SCNC: 24 MMOL/L (ref 22–29)
COLOR UR: ABNORMAL
CREAT SERPL-MCNC: 1.12 MG/DL (ref 0.76–1.27)
DEPRECATED RDW RBC AUTO: 39.4 FL (ref 37–54)
EGFRCR SERPLBLD CKD-EPI 2021: 90.1 ML/MIN/1.73
EOSINOPHIL # BLD AUTO: 0.1 10*3/MM3 (ref 0–0.4)
EOSINOPHIL NFR BLD AUTO: 0.9 % (ref 0.3–6.2)
ERYTHROCYTE [DISTWIDTH] IN BLOOD BY AUTOMATED COUNT: 12.8 % (ref 12.3–15.4)
GLOBULIN UR ELPH-MCNC: 2.8 GM/DL
GLUCOSE SERPL-MCNC: 118 MG/DL (ref 65–99)
GLUCOSE UR STRIP-MCNC: NEGATIVE MG/DL
HCT VFR BLD AUTO: 46.3 % (ref 37.5–51)
HGB BLD-MCNC: 15.3 G/DL (ref 13–17.7)
HGB UR QL STRIP.AUTO: ABNORMAL
HOLD SPECIMEN: NORMAL
HOLD SPECIMEN: NORMAL
HYALINE CASTS UR QL AUTO: ABNORMAL /LPF
KETONES UR QL STRIP: ABNORMAL
LEUKOCYTE ESTERASE UR QL STRIP.AUTO: NEGATIVE
LIPASE SERPL-CCNC: 19 U/L (ref 13–60)
LYMPHOCYTES # BLD AUTO: 1.4 10*3/MM3 (ref 0.7–3.1)
LYMPHOCYTES NFR BLD AUTO: 17.2 % (ref 19.6–45.3)
MCH RBC QN AUTO: 29.3 PG (ref 26.6–33)
MCHC RBC AUTO-ENTMCNC: 33.1 G/DL (ref 31.5–35.7)
MCV RBC AUTO: 88.6 FL (ref 79–97)
MONOCYTES # BLD AUTO: 0.6 10*3/MM3 (ref 0.1–0.9)
MONOCYTES NFR BLD AUTO: 7.8 % (ref 5–12)
NEUTROPHILS NFR BLD AUTO: 5.9 10*3/MM3 (ref 1.7–7)
NEUTROPHILS NFR BLD AUTO: 73.7 % (ref 42.7–76)
NITRITE UR QL STRIP: NEGATIVE
NRBC BLD AUTO-RTO: 0.1 /100 WBC (ref 0–0.2)
PH UR STRIP.AUTO: <=5 [PH] (ref 5–8)
PLATELET # BLD AUTO: 255 10*3/MM3 (ref 140–450)
PMV BLD AUTO: 8.2 FL (ref 6–12)
POTASSIUM SERPL-SCNC: 4.2 MMOL/L (ref 3.5–5.2)
PROT SERPL-MCNC: 7.4 G/DL (ref 6–8.5)
PROT UR QL STRIP: ABNORMAL
RBC # BLD AUTO: 5.22 10*6/MM3 (ref 4.14–5.8)
RBC # UR STRIP: ABNORMAL /HPF
REF LAB TEST METHOD: ABNORMAL
SODIUM SERPL-SCNC: 138 MMOL/L (ref 136–145)
SP GR UR STRIP: 1.02 (ref 1–1.03)
SQUAMOUS #/AREA URNS HPF: ABNORMAL /HPF
UROBILINOGEN UR QL STRIP: ABNORMAL
WBC # UR STRIP: ABNORMAL /HPF
WBC NRBC COR # BLD: 8 10*3/MM3 (ref 3.4–10.8)
WHOLE BLOOD HOLD COAG: NORMAL
WHOLE BLOOD HOLD SPECIMEN: NORMAL

## 2023-01-24 PROCEDURE — 96375 TX/PRO/DX INJ NEW DRUG ADDON: CPT

## 2023-01-24 PROCEDURE — 85025 COMPLETE CBC W/AUTO DIFF WBC: CPT | Performed by: EMERGENCY MEDICINE

## 2023-01-24 PROCEDURE — 25010000002 KETOROLAC TROMETHAMINE PER 15 MG: Performed by: EMERGENCY MEDICINE

## 2023-01-24 PROCEDURE — 96374 THER/PROPH/DIAG INJ IV PUSH: CPT

## 2023-01-24 PROCEDURE — 83690 ASSAY OF LIPASE: CPT | Performed by: EMERGENCY MEDICINE

## 2023-01-24 PROCEDURE — 81001 URINALYSIS AUTO W/SCOPE: CPT | Performed by: EMERGENCY MEDICINE

## 2023-01-24 PROCEDURE — 99283 EMERGENCY DEPT VISIT LOW MDM: CPT

## 2023-01-24 PROCEDURE — 74176 CT ABD & PELVIS W/O CONTRAST: CPT

## 2023-01-24 PROCEDURE — 80053 COMPREHEN METABOLIC PANEL: CPT | Performed by: EMERGENCY MEDICINE

## 2023-01-24 PROCEDURE — 25010000002 ONDANSETRON PER 1 MG: Performed by: EMERGENCY MEDICINE

## 2023-01-24 RX ORDER — ONDANSETRON 4 MG/1
4 TABLET, ORALLY DISINTEGRATING ORAL EVERY 8 HOURS PRN
Qty: 12 TABLET | Refills: 0 | Status: SHIPPED | OUTPATIENT
Start: 2023-01-24

## 2023-01-24 RX ORDER — SODIUM CHLORIDE 0.9 % (FLUSH) 0.9 %
10 SYRINGE (ML) INJECTION AS NEEDED
Status: DISCONTINUED | OUTPATIENT
Start: 2023-01-24 | End: 2023-01-25 | Stop reason: HOSPADM

## 2023-01-24 RX ORDER — ONDANSETRON 2 MG/ML
4 INJECTION INTRAMUSCULAR; INTRAVENOUS ONCE
Status: COMPLETED | OUTPATIENT
Start: 2023-01-24 | End: 2023-01-24

## 2023-01-24 RX ORDER — KETOROLAC TROMETHAMINE 15 MG/ML
15 INJECTION, SOLUTION INTRAMUSCULAR; INTRAVENOUS ONCE
Status: COMPLETED | OUTPATIENT
Start: 2023-01-24 | End: 2023-01-24

## 2023-01-24 RX ORDER — KETOROLAC TROMETHAMINE 10 MG/1
10 TABLET, FILM COATED ORAL EVERY 6 HOURS PRN
Qty: 15 TABLET | Refills: 0 | Status: SHIPPED | OUTPATIENT
Start: 2023-01-24 | End: 2023-01-28

## 2023-01-24 RX ADMIN — SODIUM CHLORIDE 1000 ML: 9 INJECTION, SOLUTION INTRAVENOUS at 20:27

## 2023-01-24 RX ADMIN — KETOROLAC TROMETHAMINE 15 MG: 15 INJECTION, SOLUTION INTRAMUSCULAR; INTRAVENOUS at 20:27

## 2023-01-24 RX ADMIN — ONDANSETRON 4 MG: 2 INJECTION INTRAMUSCULAR; INTRAVENOUS at 20:27

## 2023-01-25 NOTE — ED PROVIDER NOTES
Subjective   History of Present Illness  32-year-old male prior medical history of a leaky valve presents for left flank pain.  Started having some urgency yesterday and felt difficult to urinate and then approximately 2 hours ago had severe flank pain that caused him to vomit and he became sweaty.  Never had anything like this before.  No history of kidney stones.  Not take anything for the pain yet.  No fevers but did feel sweaty at the time.      Review of Systems   Constitutional: Positive for diaphoresis.   Gastrointestinal: Positive for nausea and vomiting.   Genitourinary: Positive for difficulty urinating, flank pain and urgency.   All other systems reviewed and are negative.      Past Medical History:   Diagnosis Date   • Heart murmur    • Low back pain    • Pain in both feet    • Sever's disease 2003       No Known Allergies    Past Surgical History:   Procedure Laterality Date   • COLONOSCOPY      NEG = 2020   • PILONIDAL CYSTECTOMY  2019       Family History   Problem Relation Age of Onset   • No Known Problems Father    • Stroke Maternal Grandmother    • Hypertension Maternal Grandmother    • Hypertension Paternal Grandfather    • Heart disease Paternal Grandfather         Hypertrophic Cardiomyopathy/ Valve   • Diabetes Other    • Stroke Other    • No Known Problems Mother    • No Known Problems Sister    • Cancer Maternal Grandfather         Lung Cancer       Social History     Socioeconomic History   • Marital status:    Tobacco Use   • Smoking status: Light Smoker     Types: Cigars   • Smokeless tobacco: Never   • Tobacco comments:     Social once or twice a year   Vaping Use   • Vaping Use: Never used   Substance and Sexual Activity   • Alcohol use: Yes     Alcohol/week: 4.0 - 5.0 standard drinks     Types: 4 - 5 Cans of beer per week     Comment: occ   • Drug use: Never   • Sexual activity: Yes     Partners: Female     Birth control/protection: I.U.D.           Objective   Physical  "Exam  Constitutional:  No acute distress.  Head:  Atraumatic.  Normocephalic.   Eyes:  No scleral icterus. Normal conjunctivae  ENT:  Moist mucosa.  No nasal discharge present.  Cardiovascular:  Well perfused.  Equal pulses.  Regular rate.  Normal capillary refill.    Pulmonary/Chest:  No respiratory distress.  Airway patent.  No tachypnea.  No accessory muscle usage.    Abdominal:  Nondistended. Nontender.  No rebound or guarding.  Tenderness palpation over left flank.  Back: No CVA tenderness to palpation bilaterally.  Extremities:  No peripheral edema.  No Deformity  Skin:  Warm, dry  Neurological:  Alert, awake, and appropriate.  Normal speech.      Procedures           ED Course      /76 (BP Location: Left arm, Patient Position: Sitting)   Pulse 76   Temp 98.3 °F (36.8 °C) (Oral)   Resp 17   Ht 190.5 cm (75\")   Wt 129 kg (283 lb 11.7 oz)   SpO2 100%   BMI 35.46 kg/m²   Labs Reviewed   COMPREHENSIVE METABOLIC PANEL - Abnormal; Notable for the following components:       Result Value    Glucose 118 (*)     ALT (SGPT) 44 (*)     Alkaline Phosphatase 121 (*)     All other components within normal limits    Narrative:     GFR Normal >60  Chronic Kidney Disease <60  Kidney Failure <15     URINALYSIS W/ MICROSCOPIC IF INDICATED (NO CULTURE) - Abnormal; Notable for the following components:    Color, UA Dark Yellow (*)     Ketones, UA Trace (*)     Blood, UA Large (3+) (*)     Protein, UA Trace (*)     All other components within normal limits   CBC WITH AUTO DIFFERENTIAL - Abnormal; Notable for the following components:    Lymphocyte % 17.2 (*)     All other components within normal limits   URINALYSIS, MICROSCOPIC ONLY - Abnormal; Notable for the following components:    RBC, UA Too Numerous to Count (*)     WBC, UA 0-2 (*)     All other components within normal limits   LIPASE - Normal   RAINBOW DRAW    Narrative:     The following orders were created for panel order [facility] Draw.  Procedure             "                   Abnormality         Status                     ---------                               -----------         ------                     Green Top (Gel)[[id number]]                                  Final result               Lavender Top[[id number]]                                     Final result               Gold Top - SST[[id number]]                                   Final result               Light Blue Top[[id number]]                                   Final result                 Please view results for these tests on the individual orders.   GREEN TOP   LAVENDER TOP   GOLD TOP - SST   LIGHT BLUE TOP   CBC AND DIFFERENTIAL    Narrative:     The following orders were created for panel order CBC & Differential.  Procedure                               Abnormality         Status                     ---------                               -----------         ------                     CBC Auto Differential[[id number]]        Abnormal            Final result                 Please view results for these tests on the individual orders.     Medications   sodium chloride 0.9 % flush 10 mL (has no administration in time range)   sodium chloride 0.9 % bolus 1,000 mL (0 mL Intravenous Stopped 1/24/23 2206)   ondansetron (ZOFRAN) injection 4 mg (4 mg Intravenous Given 1/24/23 2027)   ketorolac (TORADOL) injection 15 mg (15 mg Intravenous Given 1/24/23 2027)     CT Abdomen Pelvis Without Contrast    Result Date: 1/24/2023  Impression: 1. No acute abnormality in abdomen or pelvis. 2. Chronic incidental findings above. Electronically Signed: Eduar Mouser  1/24/2023 9:40 PM EST  Workstation ID: [id number]                                         MDM  Differential Dx (Includes but not limited to): Diverticulitis, ureterolithiasis, nephrolithiasis, urinary tract infection, pyelonephritis  Medical Records Reviewed: Urology note from December 9, 2021 or patient had consultation for vasectomy.  Labs: Significant blood on UA.   Renal function within normal limits.  No sign of infection on CBC or CBC differential.  Imaging: CT abdomen pelvis without acute findings.  Agree with interpretation  Telemetry: Not indicated  Testing considered but not ordered: CT abdomen pelvis with contrast not ordered as had high suspicion for obstructive uropathy.  Nature of Complaint: Acute with systemic symptoms  Admission vs Discharge: Patient asymptomatic now.  Will discharge home.  Discussion: Patient complaining of classic waxing and waning ureterolithiasis pain.  Blood in urine.  Despite negative CT high suspicion for this.  Either small stone that fell in between slices of CTs or already passed.  Will DC with Toradol and Zofran in case symptoms resume.  Given urology follow-up.  Will DC.    Final diagnoses:   Ureterolithiasis       ED Disposition  ED Disposition     ED Disposition   Discharge    Condition   Stable    Comment   --             [facility] - [facility]  [address] [facility] [address]  [phone]  In 2 weeks  As needed         Medication List      New Prescriptions    ketorolac 10 MG tablet  Commonly known as: TORADOL  Take 1 tablet by mouth Every 6 (Six) Hours As Needed for Moderate Pain for up to 4 days.     ondansetron ODT 4 MG disintegrating tablet  Commonly known as: ZOFRAN-ODT  Place 1 tablet on the tongue Every 8 (Eight) Hours As Needed for Nausea or Vomiting.           Where to Get Your Medications      These medications were sent to [facility] [id number] - [place], [place] - [address] [place] [address] [address]  - [phone]  - [phone] [address] [place] [address]    Phone: [phone]   · ketorolac 10 MG tablet  · ondansetron ODT 4 MG disintegrating tablet          Kenny Ambrosio MD  01/24/23 3343

## 2023-04-05 ENCOUNTER — HOSPITAL ENCOUNTER (OUTPATIENT)
Dept: PAIN MEDICINE | Facility: HOSPITAL | Age: 33
Discharge: HOME | End: 2023-04-05
Payer: COMMERCIAL

## 2023-04-05 VITALS
SYSTOLIC BLOOD PRESSURE: 139 MMHG | DIASTOLIC BLOOD PRESSURE: 89 MMHG | RESPIRATION RATE: 16 BRPM | TEMPERATURE: 98 F | HEIGHT: 75 IN | BODY MASS INDEX: 35.19 KG/M2 | WEIGHT: 283 LBS | HEART RATE: 77 BPM | OXYGEN SATURATION: 97 %

## 2023-04-05 DIAGNOSIS — M43.06 PARS DEFECT OF LUMBAR SPINE: Primary | ICD-10-CM

## 2023-04-05 DIAGNOSIS — R52 PAIN: ICD-10-CM

## 2023-04-05 DIAGNOSIS — M54.16 LUMBAR RADICULOPATHY: ICD-10-CM

## 2023-04-05 PROCEDURE — 25010000002 ROPIVACAINE PER 1 MG: Performed by: STUDENT IN AN ORGANIZED HEALTH CARE EDUCATION/TRAINING PROGRAM

## 2023-04-05 PROCEDURE — 62323 NJX INTERLAMINAR LMBR/SAC: CPT | Performed by: STUDENT IN AN ORGANIZED HEALTH CARE EDUCATION/TRAINING PROGRAM

## 2023-04-05 PROCEDURE — 77003 FLUOROGUIDE FOR SPINE INJECT: CPT

## 2023-04-05 PROCEDURE — 25510000001 IOPAMIDOL 61 % SOLUTION: Performed by: STUDENT IN AN ORGANIZED HEALTH CARE EDUCATION/TRAINING PROGRAM

## 2023-04-05 PROCEDURE — 25010000002 METHYLPREDNISOLONE PER 40 MG: Performed by: STUDENT IN AN ORGANIZED HEALTH CARE EDUCATION/TRAINING PROGRAM

## 2023-04-05 RX ORDER — ROPIVACAINE HYDROCHLORIDE 2 MG/ML
5 INJECTION, SOLUTION EPIDURAL; INFILTRATION; PERINEURAL ONCE
Status: COMPLETED | OUTPATIENT
Start: 2023-04-05 | End: 2023-04-05

## 2023-04-05 RX ORDER — METHYLPREDNISOLONE ACETATE 40 MG/ML
40 INJECTION, SUSPENSION INTRA-ARTICULAR; INTRALESIONAL; INTRAMUSCULAR; SOFT TISSUE ONCE
Status: COMPLETED | OUTPATIENT
Start: 2023-04-05 | End: 2023-04-05

## 2023-04-05 RX ADMIN — METHYLPREDNISOLONE ACETATE 40 MG: 40 INJECTION, SUSPENSION INTRA-ARTICULAR; INTRALESIONAL; INTRAMUSCULAR; INTRASYNOVIAL; SOFT TISSUE at 10:40

## 2023-04-05 RX ADMIN — IOPAMIDOL 1 ML: 612 INJECTION, SOLUTION INTRAVENOUS at 10:40

## 2023-04-05 RX ADMIN — ROPIVACAINE HYDROCHLORIDE 3 ML: 2 INJECTION EPIDURAL; INFILTRATION; PERINEURAL at 10:40

## 2023-04-05 NOTE — ADDENDUM NOTE
Encounter addended by: Dara Andrews RN on: 4/5/2023 11:01 AM   Actions taken: MAR administration accepted

## 2023-04-05 NOTE — PROCEDURES
Lumbar Epidural Steroid Injection  [facility]    PREOPERATIVE DIAGNOSIS:   Chronic low back pain, Lumbar Degenerative Disc Disease and Lumbar Disc Displacement  POSTOPERATIVE DIAGNOSIS:  Same as preop diagnosis    PROCEDURE:   Lumbar Epidural Steroid Injection, Therapeutic Translaminar Injection, with epidurogram, at  L5/S1 level    PRE-PROCEDURE DISCUSSION WITH PATIENT:    Risks and complications were discussed with the patient prior to starting the procedure and informed consent was obtained.  We discussed various topics including but not limited to bleeding, infection, injury, paralysis, nerve injury, dural puncture, coma, death, worsening of clinical picture, lack of pain relief, and postprocedural soreness.    SURGEON:  Adiel Weeks MD    REASON FOR PROCEDURE:    Diagnostic injection at this level is needed, Degenerative changes are noted in the area. and Radiating pattern of pain is likely consistent with degenerative changes in the area.    SEDATION:  Patient declined administration of moderate sedation    ANESTHETIC:  Ropi 0.2%  STEROID:   Methylprednisolone (DEPO MEDROL) 40mg/ml    DESCRIPTON OF PROCEDURE:    After obtaining informed consent, I.V. was not started in the preop area.   The patient was taken to the operating room and placed in the prone position.  All pressure points were well padded.  The lumbar spine area was prepped with Chloraprep and draped in a sterile fashion.  Under fluoroscopic guidance, the above mentioned interlaminar space was identified. Skin and subcutaneous tissues were anesthetized with 1% lidocaine in the middle of the space. A Tuohy needle was introduced through the skin and advanced to this interlaminar space and into the epidural space under fluoroscopic guidance and verified with loss-of-resistance technique to air.  After confirming the position of the needle with the fluoroscope with all the views, and after aspiration was confirmed negative for blood and CSF,  1.5 mL of Omnipaque was injected.  After seeing appropriate epidurogram with lateral and PA views, a total of 4 cc solution was injected, consisting of 3cc of local anesthetic as above, with normal saline and injectable steroid as above.     ESTIMATED BLOOD LOSS:  <5 mL  SPECIMENS:  None    COMPLICATIONS:     No complications were noted., There was no indication of vascular uptake on live injection of contrast dye., There was no indication of intrathecal uptake on live injection of contrast dye. and There was not any evidence of dural puncture.      TOLERANCE & DISCHARGE CONDITION:    The patient tolerated the procedure well.  The patient was transported to the recovery area without difficulties.  The patient was discharged to home under the care of family in stable and satisfactory condition.    PLAN OF CARE:  1. The patient was given our standard instruction sheet.  2. The patient will Return to clinic 4-6 wks  3. The patient will resume all medications as per the medication reconciliation sheet.

## 2023-04-05 NOTE — DISCHARGE INSTRUCTIONS

## 2023-04-06 ENCOUNTER — TELEPHONE (OUTPATIENT)
Dept: PAIN MEDICINE | Facility: HOSPITAL | Age: 33
End: 2023-04-06
Payer: COMMERCIAL

## 2023-07-26 ENCOUNTER — HOSPITAL ENCOUNTER (OUTPATIENT)
Dept: PAIN MEDICINE | Facility: HOSPITAL | Age: 33
Discharge: HOME | End: 2023-07-26
Payer: COMMERCIAL

## 2023-07-26 VITALS
SYSTOLIC BLOOD PRESSURE: 125 MMHG | DIASTOLIC BLOOD PRESSURE: 90 MMHG | TEMPERATURE: 97.1 F | RESPIRATION RATE: 16 BRPM | BODY MASS INDEX: 35.19 KG/M2 | WEIGHT: 283 LBS | HEIGHT: 75 IN | HEART RATE: 92 BPM | OXYGEN SATURATION: 96 %

## 2023-07-26 DIAGNOSIS — M43.06 PARS DEFECT OF LUMBAR SPINE: Primary | ICD-10-CM

## 2023-07-26 DIAGNOSIS — M54.16 LUMBAR RADICULOPATHY: ICD-10-CM

## 2023-07-26 DIAGNOSIS — R52 PAIN: ICD-10-CM

## 2023-07-26 PROCEDURE — 25010000002 METHYLPREDNISOLONE PER 40 MG: Performed by: STUDENT IN AN ORGANIZED HEALTH CARE EDUCATION/TRAINING PROGRAM

## 2023-07-26 PROCEDURE — 77003 FLUOROGUIDE FOR SPINE INJECT: CPT

## 2023-07-26 PROCEDURE — 62323 NJX INTERLAMINAR LMBR/SAC: CPT | Performed by: STUDENT IN AN ORGANIZED HEALTH CARE EDUCATION/TRAINING PROGRAM

## 2023-07-26 PROCEDURE — 25510000001 IOPAMIDOL 61 % SOLUTION: Performed by: STUDENT IN AN ORGANIZED HEALTH CARE EDUCATION/TRAINING PROGRAM

## 2023-07-26 PROCEDURE — 25010000002 BUPIVACAINE (PF) 0.25 % SOLUTION: Performed by: STUDENT IN AN ORGANIZED HEALTH CARE EDUCATION/TRAINING PROGRAM

## 2023-07-26 RX ORDER — METHYLPREDNISOLONE ACETATE 40 MG/ML
40 INJECTION, SUSPENSION INTRA-ARTICULAR; INTRALESIONAL; INTRAMUSCULAR; SOFT TISSUE ONCE
Status: COMPLETED | OUTPATIENT
Start: 2023-07-26 | End: 2023-07-26

## 2023-07-26 RX ORDER — BUPIVACAINE HYDROCHLORIDE 2.5 MG/ML
10 INJECTION, SOLUTION EPIDURAL; INFILTRATION; INTRACAUDAL ONCE
Status: COMPLETED | OUTPATIENT
Start: 2023-07-26 | End: 2023-07-26

## 2023-07-26 RX ADMIN — IOPAMIDOL 1 ML: 612 INJECTION, SOLUTION INTRAVENOUS at 10:53

## 2023-07-26 RX ADMIN — BUPIVACAINE HYDROCHLORIDE 3 ML: 2.5 INJECTION, SOLUTION EPIDURAL; INFILTRATION; INTRACAUDAL; PERINEURAL at 10:53

## 2023-07-26 RX ADMIN — METHYLPREDNISOLONE ACETATE 40 MG: 40 INJECTION, SUSPENSION INTRA-ARTICULAR; INTRALESIONAL; INTRAMUSCULAR; INTRASYNOVIAL; SOFT TISSUE at 10:53

## 2023-07-26 NOTE — PROCEDURES
Lumbar Epidural Steroid Injection  [facility]    PREOPERATIVE DIAGNOSIS:   Chronic low back pain, Lumbar Degenerative Disc Disease and Lumbar Disc Displacement  POSTOPERATIVE DIAGNOSIS:  Same as preop diagnosis    PROCEDURE:   Lumbar Epidural Steroid Injection, Therapeutic Translaminar Injection, with epidurogram, at  L4/L5 level    PRE-PROCEDURE DISCUSSION WITH PATIENT:    Risks and complications were discussed with the patient prior to starting the procedure and informed consent was obtained.  We discussed various topics including but not limited to bleeding, infection, injury, paralysis, nerve injury, dural puncture, coma, death, worsening of clinical picture, lack of pain relief, and postprocedural soreness.    SURGEON:  Adiel Weeks MD    REASON FOR PROCEDURE:    Diagnostic injection at this level is needed, Degenerative changes are noted in the area. and Radiating pattern of pain is likely consistent with degenerative changes in the area.    SEDATION:  Patient declined administration of moderate sedation    ANESTHETIC:   Ropi 0.2%  STEROID:   Methylprednisolone (DEPO MEDROL) 40mg/ml    DESCRIPTON OF PROCEDURE:    After obtaining informed consent, I.V. was not started in the preop area.   The patient was taken to the operating room and placed in the prone position.  All pressure points were well padded.  The lumbar spine area was prepped with Chloraprep and draped in a sterile fashion.  Under fluoroscopic guidance, the above mentioned interlaminar space was identified. Skin and subcutaneous tissues were anesthetized with 1% lidocaine in the middle of the space. A Tuohy needle was introduced through the skin and advanced to this interlaminar space and into the epidural space under fluoroscopic guidance and verified with loss-of-resistance technique to air.  After confirming the position of the needle with the fluoroscope with all the views, and after aspiration was confirmed negative for blood and  CSF, 1.5 mL of Omnipaque was injected.  After seeing appropriate epidurogram with lateral and PA views, a total of 4 cc solution was injected, consisting of 3cc of local anesthetic as above, with normal saline and injectable steroid as above.     ESTIMATED BLOOD LOSS:  <5 mL  SPECIMENS:  None    COMPLICATIONS:     No complications were noted., There was no indication of vascular uptake on live injection of contrast dye., There was no indication of intrathecal uptake on live injection of contrast dye. and There was not any evidence of dural puncture.      TOLERANCE & DISCHARGE CONDITION:    The patient tolerated the procedure well.  The patient was transported to the recovery area without difficulties.  The patient was discharged to home under the care of family in stable and satisfactory condition.    PLAN OF CARE:  The patient was given our standard instruction sheet.  The patient will Return to clinic 4-6 wks  The patient will resume all medications as per the medication reconciliation sheet.

## 2023-07-26 NOTE — DISCHARGE INSTRUCTIONS

## 2023-07-27 ENCOUNTER — TELEPHONE (OUTPATIENT)
Dept: PAIN MEDICINE | Facility: HOSPITAL | Age: 33
End: 2023-07-27
Payer: COMMERCIAL

## 2023-11-21 ENCOUNTER — TELEPHONE (OUTPATIENT)
Dept: PAIN MEDICINE | Facility: CLINIC | Age: 33
End: 2023-11-21
Payer: COMMERCIAL

## 2023-11-21 NOTE — TELEPHONE ENCOUNTER
Caller: LUCAS FALLON    Relationship to patient: Emergency Contact    Best call back number: [phone]    Chief complaint: LOWER BACK     Type of visit: INJECTION     Requested date: ASAP      Additional notes:PATIENT IS WANTING TO TRY THE 2 SERIES INJECTION THAT HIS WIFE RECEIVED  THE TRANSFORMINAL STERIOD INJECTION DUE TO HER HAVING RELIEF LONGER, STATES THAT THE INJECTIONS ARE WEARING OFF SOONER THAN NORMAL RECENTLY

## 2023-11-22 ENCOUNTER — OFFICE VISIT (OUTPATIENT)
Dept: PAIN MEDICINE | Facility: CLINIC | Age: 33
End: 2023-11-22
Payer: COMMERCIAL

## 2023-11-22 VITALS
RESPIRATION RATE: 16 BRPM | SYSTOLIC BLOOD PRESSURE: 122 MMHG | DIASTOLIC BLOOD PRESSURE: 72 MMHG | OXYGEN SATURATION: 96 % | HEART RATE: 73 BPM

## 2023-11-22 DIAGNOSIS — M54.16 LUMBAR RADICULOPATHY: Primary | ICD-10-CM

## 2023-11-22 DIAGNOSIS — M54.2 NECK PAIN: ICD-10-CM

## 2023-11-22 NOTE — PROGRESS NOTES
CHIEF COMPLAINT  Chief Complaint   Patient presents with    Back Pain     Patient here to consult for Lumbar injection  CC  Lumbar pain No Opoid Use        Primary Care  [place],     Subjective   John Andrews is a 33 y.o. male  who presents for chronic low back pain with intermittent lumbar radiculopathy.  He states the pain initially began several years ago however improved over time.  He states that recently, he reaggravated his back and his looking for more definitive treatment.  He states that his pain initially began when he was doing weight lifting as a sharp, shooting sensation in the back down the lower extremities bilaterally.  He states that over time, the pain resolved however has not returned.  He states the pain is worse with movement and physical activity and weightlifting and slightly improved with rest.  He describes pain primarily in the mid back with intermittent radiation down the left greater than right lower extremity with associated numbness and tingling.  He denies any red flag symptoms such as loss of bowel or bladder.  He did undergo MRI which shows L5 pars defect as well as L4-5 disc displacement with foraminal and mild central canal stenosis.    Back Pain  Associated symptoms include numbness. Pertinent negatives include no weakness.        Location: Low back with intermittent radiation  Onset: Years ago  Duration: Initially improved, now returned  Timing: Intermittent throughout the day  Quality: Aching, sharp, numb  Severity: Today: 2       Last Week: 2       Worst: 7  Modifying Factors: The pain is worse with movement and physical activity and slightly improved with rest  Functional Deficit: The pain makes it difficult for him to perform his activities of daily living    Physical Therapy: no    Interval Update 11/22/2023: Again with excellent benefit from his LESI.  Today, he reports that he is primarily having pain in the right lower extremity.  He really does not have any  significant pain in the left lower extremity.  We again reviewed his MRI and he has fairly significant disc protrusion on the right side of L5 which likely explains his continued pain    The following portions of the patient's history were reviewed and updated as appropriate: allergies, current medications, past family history, past medical history, past social history, past surgical history and problem list.    Procedures:  4/5/2023: LESI: 80% benefit for 3 months  7/26/2023: LESI: 80% benefit for 3 months      Current Outpatient Medications:     gabapentin (NEURONTIN) 300 MG capsule, Take 1 capsule by mouth 3 (Three) Times a Day As Needed (Radicular pain). (Patient not taking: Reported on 11/22/2023), Disp: 30 capsule, Rfl: 0    Review of Systems   Musculoskeletal:  Positive for back pain and gait problem. Negative for arthralgias, joint swelling and myalgias.   Neurological:  Positive for numbness. Negative for weakness.       Vitals:    11/22/23 1547   BP: 122/72   BP Location: Left arm   Patient Position: Sitting   Cuff Size: Adult   Pulse: 73   Resp: 16   SpO2: 96%   PainSc: 1  Comment: 5 with activity   PainLoc: Back       Objective   Physical Exam  Vitals and nursing note reviewed.   Constitutional:       General: He is not in acute distress.     Appearance: Normal appearance. He is well-developed. He is obese.   Neck:      Trachea: No tracheal deviation.   Musculoskeletal:         General: Normal range of motion.      Comments: Lumbar Spine Exam:  Tender to palpation over the lumbar paraspinal musculature No  Limited range of motion secondary to pain Yes  Facet loading positive: Negative  Facets tender to palpation: Negative  Straight leg raise test positive: Equivocal bilaterally       Neurological:      General: No focal deficit present.      Mental Status: He is alert.      Sensory: No sensory deficit.      Motor: No weakness.           Assessment & Plan   Problems Addressed this Visit    None  Visit  Diagnoses       Lumbar radiculopathy    -  Primary    Relevant Orders    Epidural Block    Neck pain        Relevant Orders    XR Spine Cervical 2 or 3 View          Diagnoses         Codes Comments    Lumbar radiculopathy    -  Primary ICD-10-CM: M54.16  ICD-9-CM: 724.4     Neck pain     ICD-10-CM: M54.2  ICD-9-CM: 723.1             Plan:  We can plan for right-sided L5 TFESI as he really does not have any significant left-sided symptoms and is overall doing better  He is complaining of some isolated shooting sensation in the neck.  We can go ahead and obtain plain films of his neck to ensure that there is no spondylosis which could be contributing to his pain.  It is likely myofascial in nature  --- Follow-up right-sided L5 TFESI         INSPECT REPORT    As part of the patient's treatment plan, I may be prescribing controlled substances. The patient has been made aware of appropriate use of such medications, including potential risk of somnolence, limited ability to drive and/or work safely, and the potential for dependence or overdose. It has also bee made clear that these medications are for use by this patient only, without concomitant use of alcohol or other substances unless prescribed.     Patient has completed prescribing agreement detailing terms of continued prescribing of controlled substances, including monitoring JOHNIE reports, urine drug screening, and pill counts if necessary. The patient is aware that inappropriate use will results in cessation of prescribing such medications.    INSPECT report has been reviewed and scanned into the patient's chart.    As the clinician, I personally reviewed the INSPECT from 11/21/2023.    History and physical exam exhibit continued safe and appropriate use of controlled substances.      EMR Dragon/Transcription disclaimer:   Much of this encounter note is an electronic transcription/translation of spoken language to printed text. The electronic translation of  spoken language may permit erroneous, or at times, nonsensical words or phrases to be inadvertently transcribed; Although I have reviewed the note for such errors, some may still exist.

## 2023-12-06 ENCOUNTER — HOSPITAL ENCOUNTER (OUTPATIENT)
Dept: PAIN MEDICINE | Facility: HOSPITAL | Age: 33
Discharge: HOME | End: 2023-12-06
Payer: COMMERCIAL

## 2023-12-06 VITALS
RESPIRATION RATE: 16 BRPM | OXYGEN SATURATION: 97 % | SYSTOLIC BLOOD PRESSURE: 127 MMHG | WEIGHT: 283 LBS | BODY MASS INDEX: 35.19 KG/M2 | HEART RATE: 69 BPM | HEIGHT: 75 IN | DIASTOLIC BLOOD PRESSURE: 81 MMHG | TEMPERATURE: 97.1 F

## 2023-12-06 DIAGNOSIS — M54.16 LUMBAR RADICULOPATHY: Primary | ICD-10-CM

## 2023-12-06 DIAGNOSIS — R52 PAIN: ICD-10-CM

## 2023-12-06 PROCEDURE — 77003 FLUOROGUIDE FOR SPINE INJECT: CPT

## 2023-12-06 PROCEDURE — 25010000002 BUPIVACAINE (PF) 0.25 % SOLUTION: Performed by: STUDENT IN AN ORGANIZED HEALTH CARE EDUCATION/TRAINING PROGRAM

## 2023-12-06 PROCEDURE — 25010000002 DEXAMETHASONE SODIUM PHOSPHATE 10 MG/ML SOLUTION: Performed by: STUDENT IN AN ORGANIZED HEALTH CARE EDUCATION/TRAINING PROGRAM

## 2023-12-06 PROCEDURE — 25510000001 IOPAMIDOL 41 % SOLUTION: Performed by: STUDENT IN AN ORGANIZED HEALTH CARE EDUCATION/TRAINING PROGRAM

## 2023-12-06 RX ORDER — DEXAMETHASONE SODIUM PHOSPHATE 10 MG/ML
10 INJECTION, SOLUTION INTRAMUSCULAR; INTRAVENOUS ONCE
Status: COMPLETED | OUTPATIENT
Start: 2023-12-06 | End: 2023-12-06

## 2023-12-06 RX ORDER — IOPAMIDOL 408 MG/ML
3 INJECTION, SOLUTION INTRATHECAL
Status: COMPLETED | OUTPATIENT
Start: 2023-12-06 | End: 2023-12-06

## 2023-12-06 RX ORDER — BUPIVACAINE HYDROCHLORIDE 2.5 MG/ML
10 INJECTION, SOLUTION EPIDURAL; INFILTRATION; INTRACAUDAL ONCE
Status: COMPLETED | OUTPATIENT
Start: 2023-12-06 | End: 2023-12-06

## 2023-12-06 RX ADMIN — BUPIVACAINE HYDROCHLORIDE 10 ML: 2.5 INJECTION, SOLUTION EPIDURAL; INFILTRATION; INTRACAUDAL; PERINEURAL at 09:28

## 2023-12-06 RX ADMIN — DEXAMETHASONE SODIUM PHOSPHATE 10 MG: 10 INJECTION, SOLUTION INTRAMUSCULAR; INTRAVENOUS at 09:28

## 2023-12-06 RX ADMIN — IOPAMIDOL 3 ML: 408 INJECTION, SOLUTION INTRATHECAL at 09:28

## 2023-12-06 NOTE — DISCHARGE INSTRUCTIONS

## 2023-12-06 NOTE — PROCEDURES
Lumbar Transforaminal Epidural Steroid Injection (one level Unilateral)  [facility]      PREOPERATIVE DIAGNOSIS:  Lumbar Radiculopathy    POSTOPERATIVE DIAGNOSIS:  Same as preop diagnosis    PROCEDURE:  CPT 04935 --  Diagnostic & Therapeutic Transforaminal Epidural Steroid Injection at the L5 level, on the right     PRE-PROCEDURE DISCUSSION WITH PATIENT:    Risks and complications were discussed with the patient prior to starting the procedure and informed consent was obtained.  We discussed various topics including but not limited to bleeding, infection, injury, nerve injury, paralysis, coma, death, postprocedural painful flare-up, postprocedural site soreness, and a lack of pain relief.  We discussed the diagnostic aspect of transforaminal epidural / selective nerve root blockade.    SURGEON:   Bernardo Weeks MD    REASON FOR PROCEDURE:    Diagnostic injection at this level is needed, Radicular pain pattern seems consistent with this dermatome., and Nerve root provocation positivity is noted.      SEDATION:  Patient declined administration of moderate sedation    ANESTHETIC:  Marcaine 0.25%  STEROID:   Dexamethasone 10mg    DESCRIPTON OF PROCEDURE:  After obtaining informed consent, an I.V. was not started in the preoperative area. The patient taken to the operating room and was placed in the prone position with a pillow under the abdomen.  All pressure points were well padded.  The lumbar area was prepped with Chloraprep and draped in a sterile fashion. Under fluoroscopic guidance in an oblique dimension on the above mentioned side, the transverse process of the aforementioned vertebra at the junction of the body at 6 o'clock position was identified. Skin and subcutaneous tissue was anesthetized with 1% lidocaine. A 22-gauge spinal needle was introduced under fluoroscopic guidance at the above junction into the foramen without parasthesias and into the epidural space. After confirming the position  of the needle with PA, lateral, and oblique fluoroscopic views, aspiration was checked and was clear of blood or CSF.  Next, 1 mL of Omnipaque was injected. After seeing adequate spread on the corresponding nerve root, a total volume 3mL of injectate containing 1ml of the above mentioned local anesthetic, 1 ml saline,  and the above mentioned corticosteroid was injected into the epidural space.    The needle was removed intact.  Vital signs were stable throughout.        ESTIMATED BLOOD LOSS:  <5 mL  SPECIMENS:  none    COMPLICATIONS:   No complications were noted., There was no indication of vascular uptake on live injection of contrast dye., and There was no indication of intrathecal uptake on live injection of contrast dye.    TOLERANCE & DISCHARGE CONDITION:    The patient tolerated the procedure well.  The patient was transported to the recovery area without difficulties.  The patient was discharged to home under the care of family in stable and satisfactory condition.    PLAN OF CARE:  The patient was given our standard instruction sheet.  The patient will Return to clinic 6 wks.  The patient will resume all medications as per the medication reconciliation sheet.

## 2023-12-07 ENCOUNTER — TELEPHONE (OUTPATIENT)
Dept: PAIN MEDICINE | Facility: HOSPITAL | Age: 33
End: 2023-12-07
Payer: COMMERCIAL

## 2023-12-07 NOTE — TELEPHONE ENCOUNTER
Post procedure phone call completed.  Pt states they are doing good and denies questions or concerns.  Patient reports pain level a #1.

## 2024-02-28 ENCOUNTER — OFFICE VISIT (OUTPATIENT)
Dept: PAIN MEDICINE | Facility: CLINIC | Age: 34
End: 2024-02-28
Payer: COMMERCIAL

## 2024-02-28 VITALS
RESPIRATION RATE: 16 BRPM | SYSTOLIC BLOOD PRESSURE: 130 MMHG | BODY MASS INDEX: 34.12 KG/M2 | WEIGHT: 273 LBS | HEART RATE: 89 BPM | OXYGEN SATURATION: 97 % | DIASTOLIC BLOOD PRESSURE: 94 MMHG

## 2024-02-28 DIAGNOSIS — M43.06 PARS DEFECT OF LUMBAR SPINE: ICD-10-CM

## 2024-02-28 DIAGNOSIS — M54.16 LUMBAR RADICULOPATHY: Primary | ICD-10-CM

## 2024-02-28 NOTE — PROGRESS NOTES
CHIEF COMPLAINT  Chief Complaint   Patient presents with    Back Pain     8 wk f/u from transforaminal epidural  CC  Lumbar Radiculopathy  No Opoid Use        Primary Care  Mable Key DO    Subjective   Johnchikis Andrews is a 33 y.o. male  who presents for chronic low back pain with intermittent lumbar radiculopathy.  He states the pain initially began several years ago however improved over time.  He states that recently, he reaggravated his back and his looking for more definitive treatment.  He states that his pain initially began when he was doing weight lifting as a sharp, shooting sensation in the back down the lower extremities bilaterally.  He states that over time, the pain resolved however has not returned.  He states the pain is worse with movement and physical activity and weightlifting and slightly improved with rest.  He describes pain primarily in the mid back with intermittent radiation down the left greater than right lower extremity with associated numbness and tingling.  He denies any red flag symptoms such as loss of bowel or bladder.  He did undergo MRI which shows L5 pars defect as well as L4-5 disc displacement with foraminal and mild central canal stenosis.    Back Pain  Associated symptoms include numbness. Pertinent negatives include no weakness.        Location: Low back with intermittent radiation  Onset: Years ago  Duration: Initially improved, now returned  Timing: Intermittent throughout the day  Quality: Aching, sharp, numb  Severity: Today: 2       Last Week: 2       Worst: 7  Modifying Factors: The pain is worse with movement and physical activity and slightly improved with rest  Functional Deficit: The pain makes it difficult for him to perform his activities of daily living    Physical Therapy: no    Interval Update 02/28/2024: Unfortunately, much less benefit with her most recent TFESI.  He had gotten benefit in the past from interlaminar CAT.  He continues to describe  primarily right-sided pain especially into the right leg.    The following portions of the patient's history were reviewed and updated as appropriate: allergies, current medications, past family history, past medical history, past social history, past surgical history and problem list.    Procedures:  4/5/2023: LESI: 80% benefit for 3 months  7/26/2023: LESI: 80% benefit for 3 months      Current Outpatient Medications:     gabapentin (NEURONTIN) 300 MG capsule, Take 1 capsule by mouth 3 (Three) Times a Day As Needed (Radicular pain). (Patient not taking: Reported on 2/28/2024), Disp: 30 capsule, Rfl: 0    Review of Systems   Musculoskeletal:  Positive for back pain and gait problem. Negative for arthralgias, joint swelling and myalgias.   Neurological:  Positive for numbness. Negative for weakness.       Vitals:    02/28/24 1355   BP: 130/94   BP Location: Left arm   Patient Position: Sitting   Cuff Size: Large Adult   Pulse: 89   Resp: 16   SpO2: 97%   Weight: 124 kg (273 lb)   PainSc:   3   PainLoc: Back       Objective   Physical Exam  Vitals and nursing note reviewed.   Constitutional:       General: He is not in acute distress.     Appearance: Normal appearance. He is well-developed. He is obese.   Neck:      Trachea: No tracheal deviation.   Musculoskeletal:         General: Normal range of motion.      Comments: Lumbar Spine Exam:  Tender to palpation over the lumbar paraspinal musculature No  Limited range of motion secondary to pain Yes  Facet loading positive: Negative  Facets tender to palpation: Negative  Straight leg raise test positive: Equivocal bilaterally       Neurological:      General: No focal deficit present.      Mental Status: He is alert.      Sensory: No sensory deficit.      Motor: No weakness.           Assessment & Plan   Problems Addressed this Visit    None  Visit Diagnoses       Lumbar radiculopathy    -  Primary    Relevant Orders    MRI Lumbar Spine Without Contrast    XR Spine  Lumbar Complete With Flex & Ext    Pars defect of lumbar spine        Relevant Orders    MRI Lumbar Spine Without Contrast    XR Spine Lumbar Complete With Flex & Ext          Diagnoses         Codes Comments    Lumbar radiculopathy    -  Primary ICD-10-CM: M54.16  ICD-9-CM: 724.4     Pars defect of lumbar spine     ICD-10-CM: M43.06  ICD-9-CM: 738.4             Plan:  We can plan for repeat MRI as well as flexion-extension x-rays.  It has been mentioned that he has a pars defect.  We can further evaluate this on flexion-extension films.  If this is dynamic in nature this would explain the persistent pain which is not amenable to TFESI.  Regarding the MRI, we can assess for further disc pathology as well as resolution of his previous disc extrusion.  --- Follow-up after imaging         INSPECT REPORT    As part of the patient's treatment plan, I may be prescribing controlled substances. The patient has been made aware of appropriate use of such medications, including potential risk of somnolence, limited ability to drive and/or work safely, and the potential for dependence or overdose. It has also bee made clear that these medications are for use by this patient only, without concomitant use of alcohol or other substances unless prescribed.     Patient has completed prescribing agreement detailing terms of continued prescribing of controlled substances, including monitoring JOHNIE reports, urine drug screening, and pill counts if necessary. The patient is aware that inappropriate use will results in cessation of prescribing such medications.    INSPECT report has been reviewed and scanned into the patient's chart.    As the clinician, I personally reviewed the INSPECT from 2/27/2024.    History and physical exam exhibit continued safe and appropriate use of controlled substances.      EMR Dragon/Transcription disclaimer:   Much of this encounter note is an electronic transcription/translation of spoken language to  printed text. The electronic translation of spoken language may permit erroneous, or at times, nonsensical words or phrases to be inadvertently transcribed; Although I have reviewed the note for such errors, some may still exist.

## 2024-03-26 ENCOUNTER — OFFICE VISIT (OUTPATIENT)
Dept: FAMILY MEDICINE CLINIC | Facility: CLINIC | Age: 34
End: 2024-03-26
Payer: COMMERCIAL

## 2024-03-26 ENCOUNTER — PATIENT ROUNDING (BHMG ONLY) (OUTPATIENT)
Dept: FAMILY MEDICINE CLINIC | Facility: CLINIC | Age: 34
End: 2024-03-26

## 2024-03-26 VITALS
BODY MASS INDEX: 33.07 KG/M2 | RESPIRATION RATE: 16 BRPM | HEIGHT: 75 IN | TEMPERATURE: 97.8 F | WEIGHT: 266 LBS | SYSTOLIC BLOOD PRESSURE: 118 MMHG | HEART RATE: 91 BPM | OXYGEN SATURATION: 96 % | DIASTOLIC BLOOD PRESSURE: 75 MMHG

## 2024-03-26 DIAGNOSIS — Z13.220 ENCOUNTER FOR SCREENING FOR LIPID DISORDER: ICD-10-CM

## 2024-03-26 DIAGNOSIS — F41.9 ANXIETY: Primary | ICD-10-CM

## 2024-03-26 PROCEDURE — 80061 LIPID PANEL: CPT | Performed by: FAMILY MEDICINE

## 2024-03-26 PROCEDURE — 84439 ASSAY OF FREE THYROXINE: CPT | Performed by: FAMILY MEDICINE

## 2024-03-26 PROCEDURE — 80053 COMPREHEN METABOLIC PANEL: CPT | Performed by: FAMILY MEDICINE

## 2024-03-26 PROCEDURE — 84443 ASSAY THYROID STIM HORMONE: CPT | Performed by: FAMILY MEDICINE

## 2024-03-26 RX ORDER — BUSPIRONE HYDROCHLORIDE 5 MG/1
TABLET ORAL
Qty: 30 TABLET | Refills: 0 | Status: SHIPPED | OUTPATIENT
Start: 2024-03-26

## 2024-03-26 NOTE — PROGRESS NOTES
Subjective   John Andrews is a 33 y.o. male.     Chief Complaint   Patient presents with    Anxiety     Patient states that his anxiety is getting worse and he hasn't ever tried any medication for it before.          Current Outpatient Medications:     busPIRone (BUSPAR) 5 MG tablet, 1/2 -1 tab po qday and may increase by 5mg q2-3 days to max of 1 TID, Disp: 30 tablet, Rfl: 0    Past Medical History:   Diagnosis Date    Heart murmur     Low back pain     Pain in both feet     Sever's disease 2003       Past Surgical History:   Procedure Laterality Date    COLONOSCOPY      NEG = 2020    PILONIDAL CYSTECTOMY  2019       Family History   Problem Relation Age of Onset    Anxiety disorder Mother     No Known Problems Father     Anxiety disorder Sister     Stroke Maternal Grandmother     Hypertension Maternal Grandmother     Lung cancer Maternal Grandfather     Hypertension Paternal Grandfather     Heart disease Paternal Grandfather         Hypertrophic Cardiomyopathy/ Valve    Diabetes Other     Stroke Other        Social History     Socioeconomic History    Marital status:    Tobacco Use    Smoking status: Light Smoker     Types: Cigars     Passive exposure: Never    Smokeless tobacco: Never    Tobacco comments:     Social once or twice a year   Vaping Use    Vaping status: Never Used   Substance and Sexual Activity    Alcohol use: Yes     Alcohol/week: 4.0 - 5.0 standard drinks of alcohol     Types: 4 - 5 Cans of beer per week    Drug use: Never    Sexual activity: Yes     Partners: Female     Birth control/protection: I.U.D.       History of Present Illness   The patient is a 33-year-old male who is here for anxiety.    Anxiety  He has always had anxiety in crowded social events, but over the last couple of years, it has gotten progressively worse. It takes him time to wind down if he comes from a social event before he can go to sleep. He denies any depression. He denies any constipation or diarrhea. He  denies any trouble with his temperature. His blood pressure today in the office is within normal limits.    [facility]  He has been trying to eat healthier over the last couple of months. He does eat about 2 fast food meals a week. He has been trying to reduce his sugar intake. He eats pizza one night a week. Other times, he eats vegetables, steaks, or chicken. He has lost 12 pounds since 12/01/2023. He is very active. He used to drink a lot of soda. He was drinking energy drinks, but for the last 6 weeks, he has cut down to 1 iced coffee daily. His last colonoscopy was normal. He had a pilonidal cyst removed. He had a corneal foreign body removed from his eye.    Back pain   He still has his gabapentin prescription, but he occasionally takes Tylenol for his back pain with the same relief. He used to see Dr. Weeks for his back.     He smokes minimally. He drinks alcohol 1 day a week, 3 to 5 drinks at a time. He denies any illicit drug usage. 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     His sister and mother both have anxiety. His father had lung cancer and was a heavy smoker.    The following portions of the patient's history were reviewed and updated as appropriate: allergies, current medications, past family history, past medical history, past social history, past surgical history and problem list.    Review of Systems   Constitutional:  Negative for activity change, appetite change, unexpected weight gain and unexpected weight loss.   Gastrointestinal:  Negative for constipation, diarrhea, nausea and vomiting.   Endocrine: Negative for cold intolerance and heat intolerance.   Psychiatric/Behavioral:  Positive for sleep disturbance. The patient is nervous/anxious.        Vitals:    03/26/24 0935   BP: 118/75   Pulse: 91   Resp: 16   Temp: 97.8 °F (36.6 °C)   SpO2: 96%       Objective   Physical Exam  Vitals and nursing note reviewed.   Constitutional:       General: He is not in acute  distress.     Appearance: He is well-developed. He is not ill-appearing or toxic-appearing.   HENT:      Head: Normocephalic and atraumatic.   Cardiovascular:      Rate and Rhythm: Normal rate and regular rhythm.      Heart sounds: Normal heart sounds. No murmur heard.  Pulmonary:      Effort: Pulmonary effort is normal. No respiratory distress.      Breath sounds: Normal breath sounds. No wheezing, rhonchi or rales.   Skin:     General: Skin is warm and dry.      Findings: No rash.   Neurological:      Mental Status: He is alert and oriented to person, place, and time.      Cranial Nerves: No cranial nerve deficit.      Gait: Gait normal.   Psychiatric:         Attention and Perception: Attention and perception normal.         Mood and Affect: Mood and affect normal.         Speech: Speech normal.         Behavior: Behavior normal. Behavior is cooperative.         Thought Content: Thought content normal.         Cognition and Memory: Cognition and memory normal.         Judgment: Judgment normal.       Assessment & Plan   Diagnoses and all orders for this visit:    1. Anxiety (Primary)  -     TSH  -     T4, Free    2. Encounter for screening for lipid disorder  -     Comprehensive Metabolic Panel  -     Lipid Panel    Other orders  -     busPIRone (BUSPAR) 5 MG tablet; 1/2 -1 tab po qday and may increase by 5mg q2-3 days to max of 1 TID  Dispense: 30 tablet; Refill: 0      Anxiety   I will prescribe him BuSpar 0.5 to 1 tablet once a day and adjust by 5 mg every 2 to 3 days. He will let me know if he needs to increase his dosage.    Health maintenance  I will order blood work to check his cholesterol.    Follow-up  The patient will follow-up thru my chart         Answers submitted by the patient for this visit:  Primary Reason for Visit (Submitted on 3/26/2024)  What is the primary reason for your visit?: Other  Other (Submitted on 3/26/2024)  Please describe your symptoms.: Increased anxiety  Have you had these  symptoms before?: Yes  How long have you been having these symptoms?: Greater than 2 weeks    Transcribed from ambient dictation for Mable Key DO by Brenda Ley.  03/26/24   13:55 EDT    Patient or patient representative verbalized consent to the visit recording.  I have personally performed the services described in this document as transcribed by the above individual, and it is both accurate and complete.

## 2024-03-26 NOTE — PROGRESS NOTES
Venipuncture performed in left arm by Belen Tolbert CMA  with good hemostasis. Patient tolerated well. 03/26/24 Belen Tolbert CMA

## 2024-03-27 LAB
ALBUMIN SERPL-MCNC: 4.4 G/DL (ref 3.5–5.2)
ALBUMIN/GLOB SERPL: 1.8 G/DL
ALP SERPL-CCNC: 114 U/L (ref 39–117)
ALT SERPL W P-5'-P-CCNC: 37 U/L (ref 1–41)
ANION GAP SERPL CALCULATED.3IONS-SCNC: 8 MMOL/L (ref 5–15)
AST SERPL-CCNC: 29 U/L (ref 1–40)
BILIRUB SERPL-MCNC: 0.5 MG/DL (ref 0–1.2)
BUN SERPL-MCNC: 9 MG/DL (ref 6–20)
BUN/CREAT SERPL: 7.8 (ref 7–25)
CALCIUM SPEC-SCNC: 9.7 MG/DL (ref 8.6–10.5)
CHLORIDE SERPL-SCNC: 105 MMOL/L (ref 98–107)
CHOLEST SERPL-MCNC: 143 MG/DL (ref 0–200)
CO2 SERPL-SCNC: 27 MMOL/L (ref 22–29)
CREAT SERPL-MCNC: 1.15 MG/DL (ref 0.76–1.27)
EGFRCR SERPLBLD CKD-EPI 2021: 86.7 ML/MIN/1.73
GLOBULIN UR ELPH-MCNC: 2.4 GM/DL
GLUCOSE SERPL-MCNC: 93 MG/DL (ref 65–99)
HDLC SERPL-MCNC: 43 MG/DL (ref 40–60)
LDLC SERPL CALC-MCNC: 78 MG/DL (ref 0–100)
LDLC/HDLC SERPL: 1.77 {RATIO}
POTASSIUM SERPL-SCNC: 4.4 MMOL/L (ref 3.5–5.2)
PROT SERPL-MCNC: 6.8 G/DL (ref 6–8.5)
SODIUM SERPL-SCNC: 140 MMOL/L (ref 136–145)
T4 FREE SERPL-MCNC: 1.39 NG/DL (ref 0.93–1.7)
TRIGL SERPL-MCNC: 120 MG/DL (ref 0–150)
TSH SERPL DL<=0.05 MIU/L-ACNC: 1.21 UIU/ML (ref 0.27–4.2)
VLDLC SERPL-MCNC: 22 MG/DL (ref 5–40)

## 2024-04-17 ENCOUNTER — OFFICE VISIT (OUTPATIENT)
Dept: PAIN MEDICINE | Facility: CLINIC | Age: 34
End: 2024-04-17
Payer: COMMERCIAL

## 2024-04-17 VITALS
SYSTOLIC BLOOD PRESSURE: 149 MMHG | OXYGEN SATURATION: 95 % | RESPIRATION RATE: 16 BRPM | HEART RATE: 82 BPM | DIASTOLIC BLOOD PRESSURE: 88 MMHG | WEIGHT: 263.8 LBS | BODY MASS INDEX: 32.97 KG/M2

## 2024-04-17 DIAGNOSIS — M43.06 PARS DEFECT OF LUMBAR SPINE: ICD-10-CM

## 2024-04-17 DIAGNOSIS — M54.16 LUMBAR RADICULOPATHY: Primary | ICD-10-CM

## 2024-04-17 NOTE — PROGRESS NOTES
CHIEF COMPLAINT  Chief Complaint   Patient presents with    Back Pain     6 wk f/u with MRI results  CC  Lumbar pain No Opoid Use        Primary Care  Mable Key DO    Subjective   Johnchikis Andrews is a 33 y.o. male  who presents for chronic low back pain with intermittent lumbar radiculopathy.  He states the pain initially began several years ago however improved over time.  He states that recently, he reaggravated his back and his looking for more definitive treatment.  He states that his pain initially began when he was doing weight lifting as a sharp, shooting sensation in the back down the lower extremities bilaterally.  He states that over time, the pain resolved however has not returned.  He states the pain is worse with movement and physical activity and weightlifting and slightly improved with rest.  He describes pain primarily in the mid back with intermittent radiation down the left greater than right lower extremity with associated numbness and tingling.  He denies any red flag symptoms such as loss of bowel or bladder.  He did undergo MRI which shows L5 pars defect as well as L4-5 disc displacement with foraminal and mild central canal stenosis.    Back Pain  Associated symptoms include numbness. Pertinent negatives include no weakness.        Location: Low back with intermittent radiation  Onset: Years ago  Duration: Initially improved, now returned  Timing: Intermittent throughout the day  Quality: Aching, sharp, numb  Severity: Today: 2       Last Week: 2       Worst: 7  Modifying Factors: The pain is worse with movement and physical activity and slightly improved with rest  Functional Deficit: The pain makes it difficult for him to perform his activities of daily living    Physical Therapy: no    Interval Update 04/17/2024: He presents today for MRI review.  Unfortunately, he has progressively worsening stenosis on the right with worsening disc protrusion and likely impingement of the exiting L5  nerve root.  Unfortunately, he got much less benefit from his most recent L TFESI.    The following portions of the patient's history were reviewed and updated as appropriate: allergies, current medications, past family history, past medical history, past social history, past surgical history and problem list.    Procedures:  4/5/2023: LESI: 80% benefit for 3 months  7/26/2023: LESI: 80% benefit for 3 months      Current Outpatient Medications:     busPIRone (BUSPAR) 5 MG tablet, 1/2 -1 tab po qday and may increase by 5mg q2-3 days to max of 1 TID, Disp: 30 tablet, Rfl: 0    Review of Systems   Musculoskeletal:  Positive for back pain and gait problem. Negative for arthralgias, joint swelling and myalgias.   Neurological:  Positive for numbness. Negative for weakness.       Vitals:    04/17/24 1545   BP: 149/88   BP Location: Right arm   Patient Position: Sitting   Cuff Size: Large Adult   Pulse: 82   Resp: 16   SpO2: 95%   Weight: 120 kg (263 lb 12.8 oz)   PainSc:   3   PainLoc: Back       Objective   Physical Exam  Vitals and nursing note reviewed.   Constitutional:       General: He is not in acute distress.     Appearance: Normal appearance. He is well-developed. He is obese.   Neck:      Trachea: No tracheal deviation.   Musculoskeletal:         General: Normal range of motion.      Comments: Lumbar Spine Exam:  Tender to palpation over the lumbar paraspinal musculature No  Limited range of motion secondary to pain Yes  Facet loading positive: Negative  Facets tender to palpation: Negative  Straight leg raise test positive: Equivocal bilaterally       Neurological:      General: No focal deficit present.      Mental Status: He is alert.      Sensory: No sensory deficit.      Motor: No weakness.           Assessment & Plan   Problems Addressed this Visit    None  Visit Diagnoses       Lumbar radiculopathy    -  Primary    Relevant Orders    Ambulatory Referral to Neurosurgery    Pars defect of lumbar spine               Diagnoses         Codes Comments    Lumbar radiculopathy    -  Primary ICD-10-CM: M54.16  ICD-9-CM: 724.4     Pars defect of lumbar spine     ICD-10-CM: M43.06  ICD-9-CM: 738.4             Plan:  He is pending a flexion-extension x-ray.  I will go ahead and refer him to neurosurgery given that he has failed conservative treatment and he has progressively worsening disc pathology and foraminal stenosis likely causing his continued pain  --- Follow-up after neurosurgery consultation         INSPECT REPORT    As part of the patient's treatment plan, I may be prescribing controlled substances. The patient has been made aware of appropriate use of such medications, including potential risk of somnolence, limited ability to drive and/or work safely, and the potential for dependence or overdose. It has also bee made clear that these medications are for use by this patient only, without concomitant use of alcohol or other substances unless prescribed.     Patient has completed prescribing agreement detailing terms of continued prescribing of controlled substances, including monitoring JOHNIE reports, urine drug screening, and pill counts if necessary. The patient is aware that inappropriate use will results in cessation of prescribing such medications.    INSPECT report has been reviewed and scanned into the patient's chart.    As the clinician, I personally reviewed the INSPECT from 4/16/2024.    History and physical exam exhibit continued safe and appropriate use of controlled substances.      EMR Dragon/Transcription disclaimer:   Much of this encounter note is an electronic transcription/translation of spoken language to printed text. The electronic translation of spoken language may permit erroneous, or at times, nonsensical words or phrases to be inadvertently transcribed; Although I have reviewed the note for such errors, some may still exist.

## 2024-04-29 RX ORDER — BUSPIRONE HYDROCHLORIDE 5 MG/1
TABLET ORAL
Qty: 30 TABLET | Refills: 0 | Status: SHIPPED | OUTPATIENT
Start: 2024-04-29

## 2024-05-10 NOTE — PROGRESS NOTES
"Subjective   History of Present Illness: John Andrews is a 33 y.o. male is being seen for consultation today at the request of Bernardo Weeks MD patient initially began developing back pain when he was in high school power lifting.  Pain has continued and worsened over the course of time he describes pain banding across his low back as well as pain into his right lower extremity.  He has undergone extensive conservative measures including injections and physical therapy without improvement.  The pain has become very severe and is significantly affecting his daily life.    Chief Complaint   Patient presents with    Back Pain          Previous treatment: PT, over-the-counter prescription pain medications    Previous neurosurgery:      Previous injections: Epidural injections    The following portions of the patient's history were reviewed and updated as appropriate: allergies, current medications, past family history, past medical history, past social history, past surgical history, and problem list.    Review of Systems   Constitutional:  Positive for activity change.   HENT: Negative.     Eyes: Negative.    Respiratory: Negative.     Cardiovascular: Negative.    Gastrointestinal: Negative.    Endocrine: Negative.    Genitourinary: Negative.    Musculoskeletal:  Positive for arthralgias, back pain and myalgias.   Skin: Negative.    Allergic/Immunologic: Negative.    Neurological:  Positive for weakness and numbness (+tingling in the right leg).        Achy and sore there all time  Goes into right leg    Psychiatric/Behavioral:  Positive for sleep disturbance.        Objective      /92   Pulse 87   Resp 18   Ht 190.5 cm (75\")   Wt 119 kg (262 lb)   SpO2 96%   BMI 32.75 kg/m²    Body mass index is 32.75 kg/m².  Vitals:    05/13/24 1236   PainSc:   2   PainLoc: Back           Neurologic Exam     Mental Status   Oriented to person, place, and time.     Motor Exam     Strength   Strength 5/5 throughout. "     Sensory Exam   Decreased sensation over L5 and S1 dermatomes on the right       Assessment & Plan   Independent Review of Radiographic Studies:      I personally reviewed and interpreted the images from the following studies.    MRI lumbar spine: Severe disc degeneration at L5-S1 with bilateral pars defects and foraminal stenosis right worse than left    Lumbar flexion-extension x-ray: Dynamic instability at L5-S1 with spondylolisthesis    Medical Decision Making:      John Andrews is a 33 y.o. male with L5-S1 spondylolisthesis with dynamic instability and bilateral pars defects refractory to conservative measures.  Patient's symptoms are significantly affecting his daily life.  He is a candidate for surgical intervention in the form of L5-S1 TLIF.  He understands long-term risk of adjacent segment disease especially at his age.  He will think about surgery and let us know if he would like to proceed      Diagnoses and all orders for this visit:    1. Pars defect (Primary)    2. Lumbar radiculopathy    3. Lumbar degenerative disc disease    4. Spondylolisthesis of lumbar region      No follow-ups on file.    This patient was examined wearing appropriate personal protective equipment.                      Dr. Selvin Tamayo IV    05/13/24  13:21 EDT

## 2024-05-13 ENCOUNTER — OFFICE VISIT (OUTPATIENT)
Dept: NEUROSURGERY | Facility: CLINIC | Age: 34
End: 2024-05-13
Payer: COMMERCIAL

## 2024-05-13 VITALS
HEART RATE: 87 BPM | OXYGEN SATURATION: 96 % | SYSTOLIC BLOOD PRESSURE: 133 MMHG | RESPIRATION RATE: 18 BRPM | DIASTOLIC BLOOD PRESSURE: 92 MMHG | BODY MASS INDEX: 32.58 KG/M2 | WEIGHT: 262 LBS | HEIGHT: 75 IN

## 2024-05-13 DIAGNOSIS — M51.36 LUMBAR DEGENERATIVE DISC DISEASE: ICD-10-CM

## 2024-05-13 DIAGNOSIS — M54.16 LUMBAR RADICULOPATHY: ICD-10-CM

## 2024-05-13 DIAGNOSIS — M43.16 SPONDYLOLISTHESIS OF LUMBAR REGION: ICD-10-CM

## 2024-05-13 DIAGNOSIS — M43.00 PARS DEFECT: Primary | ICD-10-CM

## 2024-05-13 PROCEDURE — 99204 OFFICE O/P NEW MOD 45 MIN: CPT | Performed by: NEUROLOGICAL SURGERY

## 2024-05-14 ENCOUNTER — TELEPHONE (OUTPATIENT)
Dept: PAIN MEDICINE | Facility: CLINIC | Age: 34
End: 2024-05-14
Payer: COMMERCIAL

## 2024-05-14 DIAGNOSIS — M43.06 PARS DEFECT OF LUMBAR SPINE: Primary | ICD-10-CM

## 2024-05-14 DIAGNOSIS — M54.16 LUMBAR RADICULOPATHY: ICD-10-CM

## 2024-05-14 NOTE — TELEPHONE ENCOUNTER
Caller: LUCAS FALLON    Relationship: SPOUSE    Best call back number: [phone]    What is the medical concern/diagnosis: LUMBAR SPINE PAIN     What specialty or service is being requested: NEUROSURGERY    What is the provider, practice or medical service name: [facility] AND THE PROVIDER IS DR. SHEILA VILLAR    What is the office location:   [address] [place] [address]    What is the office phone number:   PHONE NUMBER: [phone]   FAX NUMBER: [phone].    Any additional details: PATIENT'S SPOUSE, LUCAS WAS CALLING TO REQUEST A REFERRAL BE SENT TO DR. SHEILA VILALR FOR A SECOND OPINION. THANK YOU!

## 2024-05-16 ENCOUNTER — PATIENT MESSAGE (OUTPATIENT)
Dept: PAIN MEDICINE | Facility: CLINIC | Age: 34
End: 2024-05-16
Payer: COMMERCIAL

## 2024-05-17 ENCOUNTER — PATIENT ROUNDING (BHMG ONLY) (OUTPATIENT)
Dept: NEUROSURGERY | Facility: CLINIC | Age: 34
End: 2024-05-17
Payer: COMMERCIAL

## 2024-05-17 NOTE — PROGRESS NOTES
A My-Chart message has been sent to the patient for PATIENT ROUNDING with [facility] [place]

## 2024-10-02 ENCOUNTER — OFFICE VISIT (OUTPATIENT)
Dept: CARDIOLOGY | Facility: CLINIC | Age: 34
End: 2024-10-02
Payer: COMMERCIAL

## 2024-10-02 VITALS
DIASTOLIC BLOOD PRESSURE: 83 MMHG | SYSTOLIC BLOOD PRESSURE: 138 MMHG | OXYGEN SATURATION: 97 % | WEIGHT: 249.4 LBS | BODY MASS INDEX: 31.01 KG/M2 | HEART RATE: 71 BPM | HEIGHT: 75 IN

## 2024-10-02 DIAGNOSIS — R01.1 HEART MURMUR: Primary | ICD-10-CM

## 2024-10-02 PROCEDURE — 99213 OFFICE O/P EST LOW 20 MIN: CPT

## 2024-10-02 NOTE — PROGRESS NOTES
"    Subjective:     Encounter Date:10/02/2024      Patient ID: John Andrews is a 34 y.o. male.    Chief Complaint:  John Andrews is a pleasant 34-year-old with no significant cardiac history but was previously evaluated for a heart murmur.  Patient has not been seen by cardiology since 2022.  He currently denies any cardiac symptoms and has been doing well from cardiology standpoint.  He denies chest pain, shortness of breath, fatigue/weakness, palpitations, orthopnea, lightheadedness/dizziness, edema, syncope.  Previous echocardiogram shows normal LVEF with mild tricuspid regurgitation, aortic valve not well-visualized and mitral valve without significant disease.  Education provided on symptoms associated with valvular disease.  Patient's vital signs are stable today.  He is not a current daily smoker but reports occasional cigar use.    Follow-upPertinent negatives include no chest pain, no dizziness, no nausea, no palpitations, no shortness of breath, no headaches, no focal weakness, no abdominal pain and no cough.       The following portions of the patient's history were reviewed and updated as appropriate: allergies, current medications, past family history, past medical history, past social history, past surgical history, and problem list.    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   • Low back pain    • Pain in both feet    • Sever's disease 2003     Past Surgical History:   Procedure Laterality Date   • COLONOSCOPY      NEG = 2020   • PILONIDAL CYSTECTOMY  2019     /83 (BP Location: Right arm, Patient Position: Sitting, Cuff Size: Adult)   Pulse 71   Ht 190.5 cm (75\")   Wt 113 kg (249 lb 6.4 oz)   SpO2 97%   BMI 31.17 kg/m²   Family History   Problem Relation Age of Onset   • Anxiety disorder Mother    • No Known Problems Father    • Anxiety disorder Sister    • Stroke Maternal Grandmother    • Hypertension Maternal Grandmother    • " Lung cancer Maternal Grandfather    • Hypertension Paternal Grandfather    • Heart disease Paternal Grandfather         Hypertrophic Cardiomyopathy/ Valve   • Diabetes Other    • Stroke Other        Current Outpatient Medications:   •  busPIRone (BUSPAR) 5 MG tablet, TAKE 1/2- 1 TABLET BY MOUTH DAILY, MAY INCREASE BY 5MG EVERY 2-3 DAYS**MAX OF TAKE 1 TABLET BY MOUTH 3 TIMES A DAY** (Patient taking differently: Take 1 tablet by mouth Daily As Needed. TAKE 1/2- 1 TABLET BY MOUTH DAILY, MAY INCREASE BY 5MG EVERY 2-3 DAYS**MAX OF TAKE 1 TABLET BY MOUTH 3 TIMES A DAY**), Disp: 30 tablet, Rfl: 0    No Known Allergies    Social History     Socioeconomic History   • Marital status:    Tobacco Use   • Smoking status: Light Smoker     Types: Cigars     Start date: 2018     Passive exposure: Current   • Smokeless tobacco: Never   • Tobacco comments:     social   Vaping Use   • Vaping status: Never Used   Substance and Sexual Activity   • Alcohol use: Yes     Alcohol/week: 4.0 - 5.0 standard drinks of alcohol     Types: 4 - 5 Cans of beer per week     Comment: weekly   • Drug use: Never   • Sexual activity: Yes     Partners: Female     Birth control/protection: I.U.D.     Review of Systems   Constitutional: Negative for malaise/fatigue.   Cardiovascular:  Negative for chest pain, dyspnea on exertion, leg swelling and palpitations.   Respiratory:  Negative for cough and shortness of breath.    Gastrointestinal:  Negative for abdominal pain, nausea and vomiting.   Neurological:  Negative for dizziness, focal weakness, headaches, light-headedness and numbness.   All other systems reviewed and are negative.       Objective:     Vitals reviewed.   Constitutional:       Appearance: Overweight and not in distress.   Eyes:      Pupils: Pupils are equal, round, and reactive to light.   HENT:      Nose: Nose normal.    Mouth/Throat:      Pharynx: Oropharynx is clear.   Pulmonary:      Effort: Pulmonary effort is normal.       Breath sounds: Normal breath sounds.   Cardiovascular:      Normal rate. Regular rhythm.      Murmurs: There is a systolic murmur.   Pulses:     Intact distal pulses.   Edema:     Peripheral edema absent.   Abdominal:      Palpations: Abdomen is soft.   Musculoskeletal: Normal range of motion.      Cervical back: Normal range of motion and neck supple. Skin:     General: Skin is warm and dry.   Neurological:      General: No focal deficit present.      Mental Status: Alert and oriented to person, place and time.     Procedures    Lab Review:         MDM    Plan/recommendations  Heart murmur  Previously evaluated by cardiology for heart murmur  Echocardiogram from 2022 with normal LVEF  Mild tricuspid valve regurgitation  Aortic and pulmonic valve not well-visualized  Mitral valve without significant disease  Patient denies shortness of breath, lightheadedness/dizziness, syncope  No need to repeat echocardiogram at this time unless becomes symptomatic    Hypertension  Not currently on medical therapy  Borderline hypertension   Blood pressure 142/86, repeat 138/83  Diet and lifestyle modifications discussed  Goal blood pressure less than 130/80    Patient's previous medical records, labs, and EKG were reviewed and discussed with the patient at today's visit.     Patient to be seen as needed or in 1 year with Dr. Luque    Electronically signed by ASHLEY Singh, 10/02/24, 11:23 AM EDT.

## 2024-12-17 RX ORDER — BUSPIRONE HYDROCHLORIDE 5 MG/1
5 TABLET ORAL DAILY PRN
Qty: 30 TABLET | Refills: 0 | Status: SHIPPED | OUTPATIENT
Start: 2024-12-17

## 2024-12-18 ENCOUNTER — OFFICE VISIT (OUTPATIENT)
Dept: FAMILY MEDICINE CLINIC | Facility: CLINIC | Age: 34
End: 2024-12-18
Payer: COMMERCIAL

## 2024-12-18 VITALS
DIASTOLIC BLOOD PRESSURE: 77 MMHG | OXYGEN SATURATION: 97 % | WEIGHT: 249 LBS | HEART RATE: 97 BPM | TEMPERATURE: 98 F | BODY MASS INDEX: 30.96 KG/M2 | HEIGHT: 75 IN | SYSTOLIC BLOOD PRESSURE: 119 MMHG

## 2024-12-18 DIAGNOSIS — U07.1 LAB TEST POSITIVE FOR DETECTION OF COVID-19 VIRUS: ICD-10-CM

## 2024-12-18 DIAGNOSIS — J02.9 SORE THROAT: ICD-10-CM

## 2024-12-18 DIAGNOSIS — R05.9 COUGH, UNSPECIFIED TYPE: Primary | ICD-10-CM

## 2024-12-18 LAB
EXPIRATION DATE: ABNORMAL
EXPIRATION DATE: NORMAL
FLUAV AG UPPER RESP QL IA.RAPID: NOT DETECTED
FLUBV AG UPPER RESP QL IA.RAPID: NOT DETECTED
INTERNAL CONTROL: ABNORMAL
INTERNAL CONTROL: NORMAL
Lab: ABNORMAL
Lab: NORMAL
S PYO AG THROAT QL: NEGATIVE
SARS-COV-2 AG UPPER RESP QL IA.RAPID: DETECTED

## 2024-12-18 PROCEDURE — 87880 STREP A ASSAY W/OPTIC: CPT | Performed by: NURSE PRACTITIONER

## 2024-12-18 PROCEDURE — 87428 SARSCOV & INF VIR A&B AG IA: CPT | Performed by: NURSE PRACTITIONER

## 2024-12-18 PROCEDURE — 99213 OFFICE O/P EST LOW 20 MIN: CPT | Performed by: NURSE PRACTITIONER

## 2024-12-18 RX ORDER — ALBUTEROL SULFATE 90 UG/1
2 INHALANT RESPIRATORY (INHALATION) EVERY 4 HOURS PRN
Qty: 18 G | Refills: 0 | Status: SHIPPED | OUTPATIENT
Start: 2024-12-18

## 2024-12-18 RX ORDER — BENZONATATE 100 MG/1
100 CAPSULE ORAL 3 TIMES DAILY PRN
Qty: 30 CAPSULE | Refills: 0 | Status: SHIPPED | OUTPATIENT
Start: 2024-12-18

## 2024-12-18 RX ORDER — PREDNISONE 20 MG/1
40 TABLET ORAL DAILY
Qty: 10 TABLET | Refills: 0 | Status: SHIPPED | OUTPATIENT
Start: 2024-12-18

## 2024-12-18 NOTE — ASSESSMENT & PLAN NOTE
Cough, congestion, cold sweats headache, sore throat- He reports this started out as head pressure and mucus around Thanksgiving. He went to Urgent care and was given azithromycin. He reports he felt a little bitter, but still felt congestion. He reports yesterday he preogressively got worse. He has had bodyaches and sore throat. Denies headache. He reports he has been around his wife who is sick and has asthma.     Prescribed prednisone, albuterol PRN, and tesslon perles   Encourage hydrated

## 2024-12-18 NOTE — PROGRESS NOTES
"Chief Complaint  Chief Complaint   Patient presents with    Cough    Headache    Sore Throat    cold sweat    Generalized Body Aches        Subjective          John Andrews is a 34-year-old male who reports to the office today due to cough, congestion, cold sweats, headache, sore throat.    Cough, congestion, cold sweats headache, sore throat- He reports this started out as head pressure and mucus around Thanksgiving. He went to Urgent care and was given azithromycin. He reports he felt a little bitter, but still felt congestion. He reports yesterday he preogressively got worse. He has had bodyaches and sore throat. Denies headache. He reports he has been around his wife who is sick and has asthma.          Review of Systems   Constitutional:  Positive for chills and diaphoresis. Negative for fever.   HENT:  Positive for congestion and sore throat.    Respiratory:  Positive for cough, shortness of breath and wheezing.    Cardiovascular:  Positive for chest pain.   Gastrointestinal:  Negative for abdominal pain, nausea and vomiting.   Genitourinary:  Negative for difficulty urinating.   Musculoskeletal:  Positive for myalgias.   Skin: Negative.    Neurological:  Negative for dizziness, light-headedness and headache.        Objective   Vital Signs:   Vitals:    12/18/24 1552   BP: 119/77   Pulse: 97   Temp: 98 °F (36.7 °C)   SpO2: 97%      Estimated body mass index is 31.12 kg/m² as calculated from the following:    Height as of this encounter: 190.5 cm (75\").    Weight as of this encounter: 113 kg (249 lb).          Physical Exam  Vitals reviewed.   Constitutional:       Appearance: He is obese. He is ill-appearing.   HENT:      Head: Normocephalic and atraumatic.      Nose: Nose normal.      Mouth/Throat:      Mouth: Mucous membranes are moist.      Pharynx: Oropharynx is clear.   Eyes:      Extraocular Movements: Extraocular movements intact.      Conjunctiva/sclera: Conjunctivae normal.   Cardiovascular:      " Rate and Rhythm: Normal rate and regular rhythm.      Pulses: Normal pulses.      Heart sounds: Normal heart sounds.      Comments: S1, S2 audible  Pulmonary:      Effort: Pulmonary effort is normal.      Breath sounds: Normal breath sounds.      Comments: On room air   Abdominal:      General: Abdomen is flat.   Musculoskeletal:         General: Normal range of motion.      Cervical back: Normal range of motion.   Skin:     General: Skin is warm and dry.   Neurological:      General: No focal deficit present.      Mental Status: He is alert and oriented to person, place, and time. Mental status is at baseline.   Psychiatric:         Mood and Affect: Mood normal.         Behavior: Behavior normal.         Thought Content: Thought content normal.         Judgment: Judgment normal.                Physical Exam   Result Review :             Procedures       Assessment and Plan     Diagnoses and all orders for this visit:    1. Cough, unspecified type (Primary)  -     POC Rapid Strep A  -     POCT SARS-CoV-2 + Flu Antigen NIKOLAY    2. Sore throat  -     POC Rapid Strep A  -     POCT SARS-CoV-2 + Flu Antigen NIKOLAY    3. Lab test positive for detection of COVID-19 virus  Assessment & Plan:    Cough, congestion, cold sweats headache, sore throat- He reports this started out as head pressure and mucus around Thanksgiving. He went to Urgent care and was given azithromycin. He reports he felt a little bitter, but still felt congestion. He reports yesterday he preogressively got worse. He has had bodyaches and sore throat. Denies headache. He reports he has been around his wife who is sick and has asthma.     Prescribed prednisone, albuterol PRN, and tesslon perles   Encourage hydrated       Other orders  -     predniSONE (DELTASONE) 20 MG tablet; Take 2 tablets by mouth Daily.  Dispense: 10 tablet; Refill: 0  -     albuterol sulfate  (90 Base) MCG/ACT inhaler; Inhale 2 puffs Every 4 (Four) Hours As Needed for Wheezing.   Dispense: 18 g; Refill: 0  -     benzonatate (Tessalon Perles) 100 MG capsule; Take 1 capsule by mouth 3 (Three) Times a Day As Needed for Cough.  Dispense: 30 capsule; Refill: 0              Follow Up   Return if symptoms worsen or fail to improve.   Patient was given instructions and counseling regarding her condition or for health maintenance advice. Please see specific information pulled into the AVS if appropriate.

## 2024-12-19 ENCOUNTER — PRIOR AUTHORIZATION (OUTPATIENT)
Dept: FAMILY MEDICINE CLINIC | Facility: CLINIC | Age: 34
End: 2024-12-19
Payer: COMMERCIAL

## 2024-12-19 NOTE — TELEPHONE ENCOUNTER
PA SUBMITTED FOR Albuterol Sulfate  (90 Base)MCG/ACT aerosol    WAITING OUTCOME FROM INSURANCE     (Key: [id number])  PA Case ID #: [id number]  Rx #: [id number]

## 2024-12-23 NOTE — TELEPHONE ENCOUNTER
PA DENIED FOR Albuterol Sulfate  (90 Base)MCG/ACT aerosol    The denial was based on our criteria for Albuterol Aer Hfa.   Per your health plan's criteria, this drug is covered if you meet the following:    (1) You have tried (a minimum 30-day supply) generic albuterol HFA.  The information provided does not show that you meet the criteria listed above.    Please speak with your doctor about your options.  Reviewed by: John

## 2024-12-27 RX ORDER — ALBUTEROL SULFATE 90 UG/1
2 INHALANT RESPIRATORY (INHALATION) EVERY 4 HOURS PRN
Qty: 18 G | Refills: 0 | Status: SHIPPED | OUTPATIENT
Start: 2024-12-27

## 2025-01-13 RX ORDER — BUSPIRONE HYDROCHLORIDE 5 MG/1
5 TABLET ORAL DAILY PRN
Qty: 90 TABLET | Refills: 0 | Status: SHIPPED | OUTPATIENT
Start: 2025-01-13

## 2025-05-08 ENCOUNTER — OFFICE VISIT (OUTPATIENT)
Dept: FAMILY MEDICINE CLINIC | Facility: CLINIC | Age: 35
End: 2025-05-08
Payer: COMMERCIAL

## 2025-05-08 VITALS
BODY MASS INDEX: 28.89 KG/M2 | TEMPERATURE: 98.2 F | SYSTOLIC BLOOD PRESSURE: 114 MMHG | HEART RATE: 85 BPM | HEIGHT: 75 IN | OXYGEN SATURATION: 99 % | DIASTOLIC BLOOD PRESSURE: 79 MMHG | WEIGHT: 232.4 LBS

## 2025-05-08 DIAGNOSIS — Z00.00 ANNUAL PHYSICAL EXAM: Primary | ICD-10-CM

## 2025-05-08 DIAGNOSIS — Z13.220 ENCOUNTER FOR SCREENING FOR LIPID DISORDER: ICD-10-CM

## 2025-05-08 DIAGNOSIS — F41.9 ANXIETY: ICD-10-CM

## 2025-05-08 PROCEDURE — 36415 COLL VENOUS BLD VENIPUNCTURE: CPT | Performed by: FAMILY MEDICINE

## 2025-05-08 PROCEDURE — 80053 COMPREHEN METABOLIC PANEL: CPT | Performed by: FAMILY MEDICINE

## 2025-05-08 PROCEDURE — 99395 PREV VISIT EST AGE 18-39: CPT | Performed by: FAMILY MEDICINE

## 2025-05-08 PROCEDURE — 80061 LIPID PANEL: CPT | Performed by: FAMILY MEDICINE

## 2025-05-08 NOTE — PROGRESS NOTES
Venipuncture Blood Specimen Collection  Venipuncture performed in [place] by Bridgette Campbell MA with good hemostasis. Patient tolerated the procedure well without complications.   05/08/25   Bridgette Campbell MA

## 2025-05-08 NOTE — PROGRESS NOTES
Subjective   John Andrews is a 34 y.o. male.     Chief Complaint   Patient presents with    Annual Exam         Current Outpatient Medications:     CREATINE PO, Take  by mouth., Disp: , Rfl:     multivitamin with minerals (MULTIVITAMIN ADULT PO), Take 1 tablet by mouth Daily., Disp: , Rfl:     Past Medical History:   Diagnosis Date    Anxiety     Heart valve disease 2018    TVR/ ?Aortic root dilation    Low back pain     Sever's disease 2003       Past Surgical History:   Procedure Laterality Date    COLONOSCOPY      NEG = 2020    PILONIDAL CYSTECTOMY  2019       Family History   Problem Relation Age of Onset    Anxiety disorder Mother     No Known Problems Father     Anxiety disorder Sister     Stroke Maternal Grandmother     Hypertension Maternal Grandmother     Lung cancer Maternal Grandfather     Cancer Maternal Grandfather     Hypertension Paternal Grandfather     Heart disease Paternal Grandfather         Hypertrophic Cardiomyopathy/ Valve    Diabetes Other     Stroke Other        Social History     Socioeconomic History    Marital status:    Tobacco Use    Smoking status: Light Smoker     Types: Cigars     Start date: 2018     Passive exposure: Current    Smokeless tobacco: Never    Tobacco comments:     Social-----monthly   Vaping Use    Vaping status: Never Used   Substance and Sexual Activity    Alcohol use: Yes     Alcohol/week: 4.0 - 5.0 standard drinks of alcohol     Types: 4 - 5 Cans of beer per week    Drug use: Never    Sexual activity: Yes     Partners: Female     Birth control/protection: I.U.D.       History of Present Illness  The patient is a 34-year-old male who presents for an annual physical exam.    He has a history of a leaky heart valve, which is monitored periodically by his cardiologist, Dr. Luque, with whom he has a scheduled appointment in the fall. His last echocardiogram was conducted in 2022.    He reports hx/o persistent foot pain, which he attributes to his weight.  However, recent weight loss and improved footwear have alleviated this discomfort.    He has a family history of varicose veins, with his mother's condition being particularly severe. He has observed a progressive worsening of his own spider veins over the past year, primarily below the knee. He does not report any associated pain or protrusion of the veins. His physical activity is limited to walking, and he spends most of his workday standing.    He underwent a colonoscopy due to rectal bleeding.    He maintains good oral hygiene and recently had a root canal procedure, which successfully resolved pre-existing pain. He does not require corrective eyewear and uses sunscreen during outdoor activities such as golfing. He has lost two teeth due to cracking incidents during his college years and plans to replace them. He reports no gastrointestinal symptoms such as gas, bloating, diarrhea, constipation, black stools, bloody stools, or mucusy stools. He also reports no heartburn, chest pain, rashes, or changes in moles.     He experiences frequent urination, which he attributes to high water intake due to his workout regimen. He consumes two sugar-free energy drinks daily, along with two zero-calorie sodas, resulting in a total caffeine intake of approximately 600 mg per day. He works a swing shift and does not experience difficulty falling asleep, ensuring to cease caffeine consumption 5 to 6 hours before bedtime. He has received two COVID-19 vaccines and does not plan to receive more. He is not interested in getting tested for hepatitis C. He is currently taking vitamin supplements and creatine but has discontinued BuSpar.    SOCIAL HISTORY  He smokes cigars monthly, typically when golfing a couple of times a year. He consumes alcohol 2-3 times a week, sometimes 4-5 times, mostly on Saturday nights. He does not use drugs.    FAMILY HISTORY  His mother has varicose veins.        The following portions of the patient's  history were reviewed and updated as appropriate: allergies, current medications, past family history, past medical history, past social history, past surgical history and problem list.    Review of Systems   Constitutional:  Negative for activity change, appetite change, fatigue, unexpected weight gain and unexpected weight loss.   Respiratory:  Negative for cough, chest tightness and shortness of breath.    Cardiovascular:  Negative for chest pain, palpitations and leg swelling.   Gastrointestinal:  Negative for constipation and diarrhea.   Genitourinary:  Positive for frequency. Negative for urgency and urinary incontinence.   Musculoskeletal:  Negative for arthralgias and myalgias.   Neurological:  Negative for dizziness, facial asymmetry, speech difficulty, weakness, light-headedness, headache, memory problem and confusion.   Psychiatric/Behavioral:  Negative for sleep disturbance.        Vitals:    05/08/25 0855   BP: 114/79   Pulse: 85   Temp: 98.2 °F (36.8 °C)   SpO2: 99%       Objective   Physical Exam  Vitals and nursing note reviewed.   Constitutional:       General: He is not in acute distress.     Appearance: Normal appearance. He is well-developed. He is not ill-appearing, toxic-appearing or diaphoretic.   HENT:      Head: Normocephalic and atraumatic.      Right Ear: Tympanic membrane, ear canal and external ear normal. There is no impacted cerumen.      Left Ear: Tympanic membrane, ear canal and external ear normal. There is no impacted cerumen.      Nose: Nose normal. No congestion or rhinorrhea.      Mouth/Throat:      Mouth: Mucous membranes are moist.      Pharynx: Oropharynx is clear. No oropharyngeal exudate or posterior oropharyngeal erythema.   Eyes:      Extraocular Movements: Extraocular movements intact.      Conjunctiva/sclera: Conjunctivae normal.      Pupils: Pupils are equal, round, and reactive to light.   Neck:      Thyroid: No thyromegaly.      Vascular: No carotid bruit.    Cardiovascular:      Rate and Rhythm: Normal rate and regular rhythm.      Heart sounds: Normal heart sounds. No murmur heard.  Pulmonary:      Effort: Pulmonary effort is normal. No respiratory distress.      Breath sounds: Normal breath sounds. No stridor. No wheezing, rhonchi or rales.   Abdominal:      General: Bowel sounds are normal. There is no distension.      Palpations: Abdomen is soft. There is no mass.      Tenderness: There is no abdominal tenderness. There is no guarding or rebound.      Hernia: No hernia is present. There is no hernia in the left inguinal area or right inguinal area.   Genitourinary:     Penis: Normal and circumcised. No erythema or discharge.       Testes: Normal.         Right: Mass, tenderness or swelling not present.         Left: Mass, tenderness or swelling not present.   Musculoskeletal:         General: Normal range of motion.      Cervical back: Normal range of motion and neck supple.      Right lower leg: No edema.      Left lower leg: No edema.   Lymphadenopathy:      Cervical: No cervical adenopathy.      Lower Body: No right inguinal adenopathy. No left inguinal adenopathy.   Skin:     General: Skin is warm and dry.      Capillary Refill: Capillary refill takes less than 2 seconds.      Findings: No erythema or rash.   Neurological:      General: No focal deficit present.      Mental Status: He is alert and oriented to person, place, and time. Mental status is at baseline.      Cranial Nerves: No cranial nerve deficit.      Motor: No abnormal muscle tone.      Deep Tendon Reflexes: Reflexes normal.   Psychiatric:         Mood and Affect: Mood normal.         Behavior: Behavior normal.         Thought Content: Thought content normal.         Judgment: Judgment normal.       Physical Exam  Mouth/Throat: Mucous membranes moist, no erythema, no exudate  Respiratory: Clear to auscultation, no wheezing, rales or rhonchi  Cardiovascular: Regular rate and rhythm, no murmurs,  rubs, or gallops  Extremities: Spider veins present on lower extremities  Skin: No abnormalities, no rashes or lesions  Genitourinary: Normal external appearance, no masses, lesions, or discharge     BMI is >= 25 and <30. (Overweight) The following options were offered after discussion;: exercise counseling/recommendations and nutrition counseling/recommendations      Assessment & Plan   Diagnoses and all orders for this visit:    1. Annual physical exam (Primary)  -     Comprehensive Metabolic Panel  -     Lipid Panel    2. Encounter for screening for lipid disorder  -     Lipid Panel    3. Anxiety      Assessment & Plan  1. Annual physical examination.  - Blood pressure readings are within normal range at 114/79.  - Comprehensive blood work panel ordered today, including lipid profile, CMP, cholesterol panel, glucose levels, kidney function tests, and liver function tests.  - Urine sample will be collected for analysis.  - Advised to receive a tetanus booster shot in the event of a cut or injury.    2. Leaky heart valve.  - History of a leaky tricuspid valve and mild dilation of the aortic root.  - Under the care of Dr. Luque for this condition with an appointment scheduled in the fall.  - Regular follow-ups with the cardiologist are recommended.  - Noted questionable aortic root dilation for further evaluation by the cardiologist.    3. Foot pain.  - Reports significant improvement in foot pain with weight loss and better footwear.  - No current complaints of foot pain.  - Encouraged to continue weight management and use appropriate footwear.    4. Spider veins.  - Spider veins noted, primarily a cosmetic concern.  - Advised that progression to varicose veins can be painful.  - Recommended the use of knee-high compression hose to prevent further progression.  - Discussed family history of varicose veins and potential future interventions if needed.    5. Rectal bleeding.  - Had a colonoscopy due to rectal  bleeding.  - No further action required at this time as the issue has been addressed.  - Will plan for routine colonoscopy at age 46 unless new symptoms arise.    6. Anxiety.  - Reports improvement in anxiety symptoms with exercise.  - Advised to monitor caffeine intake as it can exacerbate anxiety symptoms.  - Discussed the impact of swing shift work on sleep and anxiety management.     Results            Patient or patient representative verbalized consent for the use of Ambient Listening during the visit with  Mable Key DO for chart documentation. 5/26/2025  09:17 EDT

## 2025-05-09 ENCOUNTER — PATIENT ROUNDING (BHMG ONLY) (OUTPATIENT)
Dept: FAMILY MEDICINE CLINIC | Facility: CLINIC | Age: 35
End: 2025-05-09
Payer: COMMERCIAL

## 2025-05-09 LAB
ALBUMIN SERPL-MCNC: 4.2 G/DL (ref 3.5–5.2)
ALBUMIN/GLOB SERPL: 1.2 G/DL
ALP SERPL-CCNC: 79 U/L (ref 39–117)
ALT SERPL W P-5'-P-CCNC: 59 U/L (ref 1–41)
ANION GAP SERPL CALCULATED.3IONS-SCNC: 10 MMOL/L (ref 5–15)
AST SERPL-CCNC: 32 U/L (ref 1–40)
BILIRUB SERPL-MCNC: 0.5 MG/DL (ref 0–1.2)
BUN SERPL-MCNC: 9 MG/DL (ref 6–20)
BUN/CREAT SERPL: 8.7 (ref 7–25)
CALCIUM SPEC-SCNC: 9.3 MG/DL (ref 8.6–10.5)
CHLORIDE SERPL-SCNC: 106 MMOL/L (ref 98–107)
CHOLEST SERPL-MCNC: 163 MG/DL (ref 0–200)
CO2 SERPL-SCNC: 23 MMOL/L (ref 22–29)
CREAT SERPL-MCNC: 1.04 MG/DL (ref 0.76–1.27)
EGFRCR SERPLBLD CKD-EPI 2021: 97.2 ML/MIN/1.73
GLOBULIN UR ELPH-MCNC: 3.4 GM/DL
GLUCOSE SERPL-MCNC: 83 MG/DL (ref 65–99)
HDLC SERPL-MCNC: 21 MG/DL (ref 40–60)
LDLC SERPL CALC-MCNC: 129 MG/DL (ref 0–100)
LDLC/HDLC SERPL: 6.1 {RATIO}
POTASSIUM SERPL-SCNC: 4.5 MMOL/L (ref 3.5–5.2)
PROT SERPL-MCNC: 7.6 G/DL (ref 6–8.5)
SODIUM SERPL-SCNC: 139 MMOL/L (ref 136–145)
TRIGL SERPL-MCNC: 69 MG/DL (ref 0–150)
VLDLC SERPL-MCNC: 13 MG/DL (ref 5–40)